# Patient Record
Sex: FEMALE | Race: BLACK OR AFRICAN AMERICAN | NOT HISPANIC OR LATINO | Employment: OTHER | URBAN - METROPOLITAN AREA
[De-identification: names, ages, dates, MRNs, and addresses within clinical notes are randomized per-mention and may not be internally consistent; named-entity substitution may affect disease eponyms.]

---

## 2017-05-31 ENCOUNTER — HOSPITAL ENCOUNTER (EMERGENCY)
Facility: HOSPITAL | Age: 37
Discharge: HOME | End: 2017-05-31
Payer: MEDICARE

## 2017-05-31 VITALS
OXYGEN SATURATION: 96 % | SYSTOLIC BLOOD PRESSURE: 155 MMHG | RESPIRATION RATE: 18 BRPM | HEART RATE: 92 BPM | HEIGHT: 63 IN | BODY MASS INDEX: 30.12 KG/M2 | TEMPERATURE: 99 F | WEIGHT: 170 LBS | DIASTOLIC BLOOD PRESSURE: 91 MMHG

## 2017-05-31 DIAGNOSIS — K02.9 DENTAL CARIES: ICD-10-CM

## 2017-05-31 DIAGNOSIS — K08.89 PAIN, DENTAL: Primary | ICD-10-CM

## 2017-05-31 PROCEDURE — 99283 EMERGENCY DEPT VISIT LOW MDM: CPT

## 2017-05-31 RX ORDER — NAPROXEN 500 MG/1
500 TABLET ORAL 2 TIMES DAILY WITH MEALS
Qty: 12 TABLET | Refills: 0 | Status: SHIPPED | OUTPATIENT
Start: 2017-05-31 | End: 2018-11-28

## 2017-05-31 RX ORDER — AMOXICILLIN AND CLAVULANATE POTASSIUM 875; 125 MG/1; MG/1
1 TABLET, FILM COATED ORAL 2 TIMES DAILY
Qty: 20 TABLET | Refills: 0 | Status: SHIPPED | OUTPATIENT
Start: 2017-05-31 | End: 2017-06-10

## 2017-05-31 NOTE — ED PROVIDER NOTES
"   History      Chief Complaint   Patient presents with    Dental Problem     " abscess to the right upper jaw"       Review of patient's allergies indicates:  No Known Allergies     HPI   HPI    5/31/2017, 2:34 PM   History obtained from the patient      History of Present Illness: Lee Ayala is a 36 y.o. female patient who presents to the Emergency Department for right upper dental pain for 2-3 days. Denies f/n/v.  Symptoms are constant and moderate in severity.  No mitigating or exacerbating factors reported.   No further complaints or concerns at this time.           PCP: Primary Doctor No       Past Medical History:  No past medical history on file.      Past Surgical History:  No past surgical history on file.        Family History:  No family history on file.        Social History:  Social History     Social History Main Topics    Smoking status: Never Smoker    Smokeless tobacco: Not on file    Alcohol use No    Drug use: Unknown    Sexual activity: Not on file       ROS     Review of Systems   Constitutional: Negative for chills and fever.   HENT: Positive for dental problem. Negative for facial swelling and sore throat.    Respiratory: Negative for shortness of breath.    Cardiovascular: Negative for chest pain.   Gastrointestinal: Negative for nausea and vomiting.   Genitourinary: Negative for dysuria.   Musculoskeletal: Negative for back pain.   Skin: Negative for rash.   Neurological: Negative for weakness.   Hematological: Does not bruise/bleed easily.   All other systems reviewed and are negative.      Physical Exam      Initial Vitals [05/31/17 1409]   BP Pulse Resp Temp SpO2   (!) 155/91 92 18 98.9 °F (37.2 °C) 96 %     Physical Exam  Vital signs and nursing notes reviewed.  Constitutional: Patient is in NAD. Awake and alert. Well-developed and well-nourished.  Head: Atraumatic. Normocephalic.  Eyes: PERRL. EOM intact. Conjunctivae nl. No scleral icterus.  ENT: Mucous " "membranes are moist. Oropharynx is clear.  Right upper gingival ttp, mild erythema and edema.  No fluctuance.  +dental caries.  No facial edema  Neck: Supple. No JVD. No lymphadenopathy.  No meningismus  Cardiovascular: Regular rate and rhythm. No murmurs, rubs, or gallops. Distal pulses are 2+ and symmetric.  Pulmonary/Chest: No respiratory distress. Clear to auscultation bilaterally. No wheezing, rales, or rhonchi.  Abdominal: Soft. Non-distended. No TTP. No rebound, guarding, or rigidity. Good bowel sounds.  Genitourinary: No CVA tenderness  Musculoskeletal: Moves all extremities. No edema.   Skin: Warm and dry.  Neurological: Awake and alert. No acute focal neurological deficits are appreciated.  Psychiatric: Normal affect. Good eye contact. Appropriate in content.      ED Course      Procedures  ED Vital Signs:  Vitals:    05/31/17 1409   BP: (!) 155/91   Pulse: 92   Resp: 18   Temp: 98.9 °F (37.2 °C)   TempSrc: Oral   SpO2: 96%   Weight: 77.1 kg (170 lb)   Height: 5' 3" (1.6 m)                 Imaging Results:  Imaging Results    None            The Emergency Provider reviewed the vital signs and test results, which are outlined above.    ED Discussion         All findings were reviewed with the patient/family in detail.  Findings seem to be most consistent with a diagnosis of dental pain and dental caries.  GAVE PT LIST OF DENTAL RESOURCES.  DISCUSSED POTENTIAL COMPLICATIONS IF PT DOES NOT FOLLOW UP WITH DENTIST.  All remaining questions and concerns were addressed at that time.  Patient/family has been counseled regarding the need for follow-up as well as the indication to return to the emergency room should new or worrisome developments occur.        Medication(s) given in the ER:  Medications - No data to display                New Prescriptions    AMOXICILLIN-CLAVULANATE 875-125MG (AUGMENTIN) 875-125 MG PER TABLET    Take 1 tablet by mouth 2 (two) times daily.    NAPROXEN (NAPROSYN) 500 MG TABLET    Take " 1 tablet (500 mg total) by mouth 2 (two) times daily with meals.          Medical Decision Making              MDM               Clinical Impression:        ICD-10-CM ICD-9-CM   1. Pain, dentalAcute K08.89 525.9   2. Dental cariesAcute K02.9 521.00              Disposition  Stable  Discharged     Rosemary Davis PA-C  05/31/17 [id number]

## 2018-11-27 NOTE — PROGRESS NOTES
"Lee Ayala  11/28/2018  [id number]    Primary Doctor No  Patient Care Team:  Primary Doctor No as PCP - General  Has the patient seen any provider outside of the [facility] network since the last visit? (yes). If yes, HIPPA forms completed and records requested.        Visit Type:New patient, annual    Chief Complaint:  Chief Complaint   Patient presents with    Establish Care    Foot Swelling     a little above her ankle then down to her feet. both sides. she said this has been going on for a long time    Edema     righ hand    Breast Problem     right side harder than left and sometimes it leaks       History of Present Illness:  New patient, annual exam    She reports pap done at [facility].  She has not had MMG.  She reports sensation that her right breast is harder than her left breast. She reports at times she has leakage from nipple.   Last period one week ago.      She has reports of history of foot swelling and this is chronic problem.She reports that going on for years. She reports that she has been seen by "leg doctor" and hasn't found anything. Never had blood clot.  She reports she has had US done of her lower ext and it was negative.  She also reports isolated swelling of her right hand.  Denies and CP or SOB.  Denies any HTN issues.     She reports she was on fluid pill in past and did not help.     She is on disability, reports collects SS, reports disability is slow cognition    History:  History reviewed. No pertinent past medical history.  History reviewed. No pertinent surgical history.  History reviewed. No pertinent family history.  Social History     Socioeconomic History    Marital status: Single     Spouse name: Not on file    Number of children: Not on file    Years of education: Not on file    Highest education level: Not on file   Social Needs    Financial resource strain: Not on file    Food insecurity - worry: Not on file    Food insecurity - " inability: Not on file    Transportation needs - medical: Not on file    Transportation needs - non-medical: Not on file   Occupational History    Not on file   Tobacco Use    Smoking status: Never Smoker    Smokeless tobacco: Never Used   Substance and Sexual Activity    Alcohol use: No    Drug use: No    Sexual activity: No   Other Topics Concern    Not on file   Social History Narrative    Not on file     There is no problem list on file for this patient.    Review of patient's allergies indicates:  No Known Allergies    The following were reviewed at this visit: active problem list, medication list, allergies, family history, social history, and health maintenance.    Medications:  Current Outpatient Medications on File Prior to Visit   Medication Sig Dispense Refill    [DISCONTINUED] naproxen (NAPROSYN) 500 MG tablet Take 1 tablet (500 mg total) by mouth 2 (two) times daily with meals. 12 tablet 0     No current facility-administered medications on file prior to visit.        Medications have been reviewed and reconciled with patient at this visit.  Barriers to medications present (no)    Adverse reactions to current medications (no)    Over the counter medications reviewed (Yes ), and if needed added to active Medication list at this visit.     Exam:  Wt Readings from Last 3 Encounters:   11/28/18 83.2 kg (183 lb 6.8 oz)   05/31/17 77.1 kg (170 lb)   05/04/15 79.8 kg (176 lb)     Temp Readings from Last 3 Encounters:   11/28/18 96.1 °F (35.6 °C) (Tympanic)   05/31/17 98.9 °F (37.2 °C) (Oral)   05/04/15 98.3 °F (36.8 °C) (Oral)     BP Readings from Last 3 Encounters:   11/28/18 127/84   05/31/17 (!) 155/91   05/04/15 136/86     Pulse Readings from Last 3 Encounters:   11/28/18 81   05/31/17 92   05/04/15 104     Body mass index is 32.49 kg/m².      Review of Systems   Constitutional: Negative.  Negative for chills and fever.   HENT: Negative.  Negative for congestion, sinus pain and sore throat.     Eyes: Negative for blurred vision and double vision.   Respiratory: Negative for cough, sputum production, shortness of breath and wheezing.    Cardiovascular: Positive for leg swelling. Negative for chest pain and palpitations.   Gastrointestinal: Negative for abdominal pain, constipation, diarrhea, heartburn, nausea and vomiting.   Genitourinary: Negative.         Breast tenderness right, intermittent discharge   Musculoskeletal: Negative.    Skin: Negative.  Negative for rash.   Neurological: Negative.    Endo/Heme/Allergies: Negative.  Negative for polydipsia. Does not bruise/bleed easily.   Psychiatric/Behavioral: Negative for depression and substance abuse.     Physical Exam   Constitutional: She is oriented to person, place, and time. She appears well-developed and well-nourished. No distress.   HENT:   Head: Normocephalic and atraumatic.   Right Ear: External ear normal.   Left Ear: External ear normal.   Nose: Nose normal.   Mouth/Throat: Oropharynx is clear and moist. No oropharyngeal exudate.   Eyes: Conjunctivae and EOM are normal. Pupils are equal, round, and reactive to light. Right eye exhibits no discharge. Left eye exhibits no discharge.   Neck: Normal range of motion. Neck supple. No thyromegaly present.   Cardiovascular: Normal rate, regular rhythm, normal heart sounds and intact distal pulses.   No murmur heard.  Pulmonary/Chest: Effort normal and breath sounds normal. No respiratory distress. She has no wheezes. Right breast exhibits no inverted nipple, no mass, no nipple discharge, no skin change and no tenderness. Left breast exhibits no inverted nipple, no mass, no nipple discharge, no skin change and no tenderness.   No expression of nipple discharge on exam  Right breast slightly larger than left, but no mass felt.  No skin changes.      Abdominal: Soft. Bowel sounds are normal. She exhibits no distension and no mass. There is no tenderness.   Musculoskeletal: Normal range of motion. She  exhibits edema.   Pedal edema to lower ext. Non pitting  No skin change.  No varicose veins.   Lymphadenopathy:     She has no cervical adenopathy.   Neurological: She is alert and oriented to person, place, and time. No cranial nerve deficit.   Skin: Capillary refill takes less than 2 seconds. She is not diaphoretic.   Psychiatric: She has a normal mood and affect. Her behavior is normal. Judgment and thought content normal.   Nursing note and vitals reviewed.      Laboratory Reviewed ({N/A)  No results found for: WBC, HGB, HCT, PLT, CHOL, TRIG, HDL, LDLDIRECT, ALT, AST, NA, K, CL, CREATININE, BUN, CO2, TSH, PSA, INR, GLUF, HGBA1C, MICROALBUR    Lee was seen today for establish care, foot swelling, edema and breast problem.    Diagnoses and all orders for this visit:    Annual physical exam  -     Comprehensive metabolic panel; Future  -     Lipid panel; Future  -     CBC auto differential; Future    Breast pain, right  -     Mammo Digital Diagnostic Bilateral With CAD; Future  -     US Breast Right Complete; Future    Nipple discharge in female  -     Mammo Digital Diagnostic Bilateral With CAD; Future  -     US Breast Right Complete; Future  -     Prolactin; Future    Edema, unspecified type   Compression stockings   Counseled on dependent edema.    Encounter for lipid screening for cardiovascular disease  -     Lipid panel; Future                  Care Plan/Goals: Reviewed  (Yes)  Goals     None          Follow up: No Follow-up on file.    After visit summary was printed and given to patient upon discharge today.  Patient goals and care plan are included in After Visit Summary.

## 2018-11-28 ENCOUNTER — LAB VISIT (OUTPATIENT)
Dept: LAB | Facility: HOSPITAL | Age: 38
End: 2018-11-28
Attending: FAMILY MEDICINE
Payer: MEDICARE

## 2018-11-28 ENCOUNTER — OFFICE VISIT (OUTPATIENT)
Dept: INTERNAL MEDICINE | Facility: CLINIC | Age: 38
End: 2018-11-28
Payer: MEDICARE

## 2018-11-28 VITALS
HEIGHT: 63 IN | SYSTOLIC BLOOD PRESSURE: 127 MMHG | DIASTOLIC BLOOD PRESSURE: 84 MMHG | OXYGEN SATURATION: 99 % | BODY MASS INDEX: 32.5 KG/M2 | TEMPERATURE: 96 F | HEART RATE: 81 BPM | WEIGHT: 183.44 LBS

## 2018-11-28 DIAGNOSIS — Z13.6 ENCOUNTER FOR LIPID SCREENING FOR CARDIOVASCULAR DISEASE: ICD-10-CM

## 2018-11-28 DIAGNOSIS — Z13.220 ENCOUNTER FOR LIPID SCREENING FOR CARDIOVASCULAR DISEASE: ICD-10-CM

## 2018-11-28 DIAGNOSIS — N64.52 NIPPLE DISCHARGE IN FEMALE: ICD-10-CM

## 2018-11-28 DIAGNOSIS — Z00.00 ANNUAL PHYSICAL EXAM: Primary | ICD-10-CM

## 2018-11-28 DIAGNOSIS — N64.4 BREAST PAIN, RIGHT: ICD-10-CM

## 2018-11-28 DIAGNOSIS — Z00.00 ANNUAL PHYSICAL EXAM: ICD-10-CM

## 2018-11-28 DIAGNOSIS — R60.9 EDEMA, UNSPECIFIED TYPE: ICD-10-CM

## 2018-11-28 LAB
ALBUMIN SERPL BCP-MCNC: 3.5 G/DL
ALP SERPL-CCNC: 50 U/L
ALT SERPL W/O P-5'-P-CCNC: 12 U/L
ANION GAP SERPL CALC-SCNC: 8 MMOL/L
AST SERPL-CCNC: 15 U/L
BASOPHILS # BLD AUTO: 0.01 K/UL
BASOPHILS NFR BLD: 0.2 %
BILIRUB SERPL-MCNC: 0.8 MG/DL
BUN SERPL-MCNC: 7 MG/DL
CALCIUM SERPL-MCNC: 9.1 MG/DL
CHLORIDE SERPL-SCNC: 103 MMOL/L
CHOLEST SERPL-MCNC: 166 MG/DL
CHOLEST/HDLC SERPL: 3.8 {RATIO}
CO2 SERPL-SCNC: 27 MMOL/L
CREAT SERPL-MCNC: 0.7 MG/DL
DIFFERENTIAL METHOD: ABNORMAL
EOSINOPHIL # BLD AUTO: 0.1 K/UL
EOSINOPHIL NFR BLD: 1.2 %
ERYTHROCYTE [DISTWIDTH] IN BLOOD BY AUTOMATED COUNT: 11.9 %
EST. GFR  (AFRICAN AMERICAN): >60 ML/MIN/1.73 M^2
EST. GFR  (NON AFRICAN AMERICAN): >60 ML/MIN/1.73 M^2
GLUCOSE SERPL-MCNC: 95 MG/DL
HCT VFR BLD AUTO: 33 %
HDLC SERPL-MCNC: 44 MG/DL
HDLC SERPL: 26.5 %
HGB BLD-MCNC: 10.9 G/DL
IMM GRANULOCYTES # BLD AUTO: 0.01 K/UL
IMM GRANULOCYTES NFR BLD AUTO: 0.2 %
LDLC SERPL CALC-MCNC: 91.4 MG/DL
LYMPHOCYTES # BLD AUTO: 1.6 K/UL
LYMPHOCYTES NFR BLD: 36.3 %
MCH RBC QN AUTO: 32.1 PG
MCHC RBC AUTO-ENTMCNC: 33 G/DL
MCV RBC AUTO: 97 FL
MONOCYTES # BLD AUTO: 0.3 K/UL
MONOCYTES NFR BLD: 7.2 %
NEUTROPHILS # BLD AUTO: 2.4 K/UL
NEUTROPHILS NFR BLD: 54.9 %
NONHDLC SERPL-MCNC: 122 MG/DL
NRBC BLD-RTO: 0 /100 WBC
PLATELET # BLD AUTO: 367 K/UL
PMV BLD AUTO: 9.7 FL
POTASSIUM SERPL-SCNC: 3.5 MMOL/L
PROLACTIN SERPL IA-MCNC: 12.5 NG/ML
PROT SERPL-MCNC: 7.3 G/DL
RBC # BLD AUTO: 3.4 M/UL
SODIUM SERPL-SCNC: 138 MMOL/L
TRIGL SERPL-MCNC: 153 MG/DL
WBC # BLD AUTO: 4.32 K/UL

## 2018-11-28 PROCEDURE — 99213 OFFICE O/P EST LOW 20 MIN: CPT | Mod: PBBFAC,25 | Performed by: FAMILY MEDICINE

## 2018-11-28 PROCEDURE — 84146 ASSAY OF PROLACTIN: CPT

## 2018-11-28 PROCEDURE — 36415 COLL VENOUS BLD VENIPUNCTURE: CPT

## 2018-11-28 PROCEDURE — 80061 LIPID PANEL: CPT

## 2018-11-28 PROCEDURE — 80053 COMPREHEN METABOLIC PANEL: CPT

## 2018-11-28 PROCEDURE — 90686 IIV4 VACC NO PRSV 0.5 ML IM: CPT | Mod: PBBFAC

## 2018-11-28 PROCEDURE — 99999 PR PBB SHADOW E&M-EST. PATIENT-LVL III: CPT | Mod: PBBFAC,,, | Performed by: FAMILY MEDICINE

## 2018-11-28 PROCEDURE — 85025 COMPLETE CBC W/AUTO DIFF WBC: CPT

## 2018-11-28 PROCEDURE — 99204 OFFICE O/P NEW MOD 45 MIN: CPT | Mod: S$PBB,,, | Performed by: FAMILY MEDICINE

## 2018-11-30 NOTE — PROGRESS NOTES
Notify patient of labs  Prolactin 12.5, normal  CMP is fine. Normal glucose, kidney and liver.   CHolesterol okay, Trig 153, goal 150. Work on low fat diet.  CBC shows an anemia, mild. I suggest MVI OTC with Iron.    Overall, labs okay.

## 2019-07-01 ENCOUNTER — TELEPHONE (OUTPATIENT)
Dept: INTERNAL MEDICINE | Facility: CLINIC | Age: 39
End: 2019-07-01

## 2019-07-01 NOTE — TELEPHONE ENCOUNTER
----- Message from Ursula Claire sent at 7/1/2019  9:19 AM CDT -----  Contact: Pt  Type:  RX Refill Request    Who Called: Lee (pt)  Refill or New Rx: Refill  RX Name and Strength: HYDROcodone-acetaminophen (NORCO) 5-325 mg per tablet  How is the patient currently taking it? (ex. 1XDay): 4XDay  Is this a 30 day or 90 day RX: 30 Day   Preferred Pharmacy with phone number:   [facility] [address] [facility]   Local or Mail Order: Local  [facility]  Would the patient rather a call back or a response via [facility]? Callback  Best Call Back Number: [phone]  Additional Information: No

## 2019-07-01 NOTE — TELEPHONE ENCOUNTER
----- Message from Ghada Gould sent at 7/1/2019  3:03 PM CDT -----  Would like to consult with the nurse, patient states that she has been trying to get a refill and was told that it was going to be sent in, patient states that's the pharmacy,  does not have it, patient would like to speak with the nurse concerning this as soon as possible, please call back at [phone], thanks sj

## 2019-07-01 NOTE — TELEPHONE ENCOUNTER
Called and spoke with patient. Advised her that she would need to be seen for a refill on that medication. Appointment on Monday.

## 2019-07-01 NOTE — TELEPHONE ENCOUNTER
No, I will not send in Refill on narcotics. Needs to be seen, as I need to determine if over 7 days of narcotics is needed.

## 2019-07-01 NOTE — TELEPHONE ENCOUNTER
Called and spoke with patient. Advised her that Narcotics can not be filled without and appointment and eval. Patient verbalized understandting. Appointment Monday and she is on the waiting list for cancellations.

## 2019-07-01 NOTE — TELEPHONE ENCOUNTER
Okay. Thanks.  NSAID For pain is fine.  Usually I would like to transition them off narcotics after a week any how, but I need to know what is going on with her and if she saw Ortho.

## 2019-07-01 NOTE — TELEPHONE ENCOUNTER
Patient coming in Monday. She called again because whoever took the message told her that her refill would be sent to her pharmacy. Please Advise

## 2019-07-08 ENCOUNTER — HOSPITAL ENCOUNTER (OUTPATIENT)
Dept: RADIOLOGY | Facility: HOSPITAL | Age: 39
Discharge: HOME | End: 2019-07-08
Attending: FAMILY MEDICINE
Payer: MEDICARE

## 2019-07-08 ENCOUNTER — OFFICE VISIT (OUTPATIENT)
Dept: INTERNAL MEDICINE | Facility: CLINIC | Age: 39
End: 2019-07-08
Payer: MEDICARE

## 2019-07-08 VITALS
TEMPERATURE: 97 F | BODY MASS INDEX: 32.05 KG/M2 | HEIGHT: 62 IN | OXYGEN SATURATION: 98 % | SYSTOLIC BLOOD PRESSURE: 130 MMHG | HEART RATE: 90 BPM | DIASTOLIC BLOOD PRESSURE: 78 MMHG | WEIGHT: 174.19 LBS

## 2019-07-08 DIAGNOSIS — M25.572 ACUTE LEFT ANKLE PAIN: ICD-10-CM

## 2019-07-08 DIAGNOSIS — M25.572 ACUTE LEFT ANKLE PAIN: Primary | ICD-10-CM

## 2019-07-08 PROCEDURE — 99999 PR PBB SHADOW E&M-EST. PATIENT-LVL III: CPT | Mod: PBBFAC,,, | Performed by: FAMILY MEDICINE

## 2019-07-08 PROCEDURE — 99214 PR OFFICE/OUTPT VISIT, EST, LEVL IV, 30-39 MIN: ICD-10-PCS | Mod: S$GLB,,, | Performed by: FAMILY MEDICINE

## 2019-07-08 PROCEDURE — 99999 PR PBB SHADOW E&M-EST. PATIENT-LVL III: ICD-10-PCS | Mod: PBBFAC,,, | Performed by: FAMILY MEDICINE

## 2019-07-08 PROCEDURE — 3008F BODY MASS INDEX DOCD: CPT | Mod: CPTII,S$GLB,, | Performed by: FAMILY MEDICINE

## 2019-07-08 PROCEDURE — 3008F PR BODY MASS INDEX (BMI) DOCUMENTED: ICD-10-PCS | Mod: CPTII,S$GLB,, | Performed by: FAMILY MEDICINE

## 2019-07-08 PROCEDURE — 73610 X-RAY EXAM OF ANKLE: CPT | Mod: TC,LT

## 2019-07-08 PROCEDURE — 99214 OFFICE O/P EST MOD 30 MIN: CPT | Mod: S$GLB,,, | Performed by: FAMILY MEDICINE

## 2019-07-08 PROCEDURE — 73610 XR ANKLE COMPLETE 3 VIEW LEFT: ICD-10-PCS | Mod: 26,LT,, | Performed by: RADIOLOGY

## 2019-07-08 PROCEDURE — 73610 X-RAY EXAM OF ANKLE: CPT | Mod: 26,LT,, | Performed by: RADIOLOGY

## 2019-07-08 RX ORDER — NAPROXEN 500 MG/1
500 TABLET ORAL 2 TIMES DAILY
Qty: 20 TABLET | Refills: 0 | Status: SHIPPED | OUTPATIENT
Start: 2019-07-08 | End: 2019-10-17 | Stop reason: SDUPTHER

## 2019-07-08 NOTE — PROGRESS NOTES
Lee Ayala  07/08/2019  [id number]    Shameka Ford MD  Patient Care Team:  Shameka Ford MD as PCP - General (Family Medicine)  Has the patient seen any provider outside of the [facility] network since the last visit? (yes). If yes, HIPPA forms completed and records requested.        Visit Type:a scheduled routine follow-up visit    Chief Complaint:  Chief Complaint   Patient presents with    left ankle pain       History of Present Illness:  Patient here for follow up ER, on her ankle.  Hurt Ankle on 6/26/2019, trip and fall.  3 views of the left ankle.    Soft tissue swelling is present in the ankle.    A thin linear lucency in the posterior talus on the lateral view likely represents a superimposed structure however correlation for focal tenderness may be beneficial. 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    She was given Norco, and told to follow up with PCP and [facility].  She does not have appt with Ortho.    Missed appt for follow up, and was put on wait list. Requested more Norco., declined.   She was placed in splint. Non weight bearing per urgent care recommendations.    History:  No past medical history on file.  No past surgical history on file.  No family history on file.  Social History     Socioeconomic History    Marital status: Single     Spouse name: Not on file    Number of children: Not on file    Years of education: Not on file    Highest education level: Not on file   Occupational History    Not on file   Social Needs    Financial resource strain: Not on file    Food insecurity:     Worry: Not on file     Inability: Not on file    Transportation needs:     Medical: Not on file     Non-medical: Not on file   Tobacco Use    Smoking status: Never Smoker    Smokeless tobacco: Never Used   Substance and Sexual Activity    Alcohol use: No    Drug use: No    Sexual activity: Never   Lifestyle    Physical activity:      Days per week: Not on file     Minutes per session: Not on file    Stress: Not on file   Relationships    Social connections:     Talks on phone: Not on file     Gets together: Not on file     Attends [employer] service: Not on file     Active member of club or organization: Not on file     Attends meetings of clubs or organizations: Not on file     Relationship status: Not on file   Other Topics Concern    Not on file   Social History Narrative    Not on file     There is no problem list on file for this patient.    Review of patient's allergies indicates:  No Known Allergies    The following were reviewed at this visit: active problem list, medication list, allergies, family history, social history, and health maintenance.    Medications:  Current Outpatient Medications on File Prior to Visit   Medication Sig Dispense Refill    HYDROcodone-acetaminophen (NORCO) 5-325 mg per tablet Take 1 tablet by mouth every 4 (four) hours as needed.       No current facility-administered medications on file prior to visit.        Medications have been reviewed and reconciled with patient at this visit.  Barriers to medications present (yes)    Adverse reactions to current medications (no)    Over the counter medications reviewed (Yes ), and if needed added to active Medication list at this visit.     Exam:  Wt Readings from Last 3 Encounters:   07/08/19 79 kg (174 lb 2.6 oz)   11/28/18 83.2 kg (183 lb 6.8 oz)   05/31/17 77.1 kg (170 lb)     Temp Readings from Last 3 Encounters:   07/08/19 97.3 °F (36.3 °C) (Tympanic)   11/28/18 96.1 °F (35.6 °C) (Tympanic)   05/31/17 98.9 °F (37.2 °C) (Oral)     BP Readings from Last 3 Encounters:   07/08/19 130/78   11/28/18 127/84   05/31/17 (!) 155/91     Pulse Readings from Last 3 Encounters:   07/08/19 90   11/28/18 81   05/31/17 92     Body mass index is 31.85 kg/m².      Review of Systems   Constitutional: Negative.  Negative for chills and fever.   HENT: Negative.  Negative for  congestion, sinus pain and sore throat.    Eyes: Negative for blurred vision and double vision.   Respiratory: Negative for cough, sputum production, shortness of breath and wheezing.    Cardiovascular: Negative for chest pain, palpitations and leg swelling.   Gastrointestinal: Negative for abdominal pain, constipation, diarrhea, heartburn, nausea and vomiting.   Genitourinary: Negative.    Musculoskeletal: Positive for joint pain.   Skin: Negative.  Negative for rash.   Neurological: Negative.    Endo/Heme/Allergies: Negative.  Negative for polydipsia. Does not bruise/bleed easily.   Psychiatric/Behavioral: Negative for depression and substance abuse.     Physical Exam   Constitutional: She is oriented to person, place, and time. She appears well-developed and well-nourished. No distress.   HENT:   Head: Normocephalic and atraumatic.   Right Ear: External ear normal.   Left Ear: External ear normal.   Nose: Nose normal.   Mouth/Throat: Oropharynx is clear and moist. No oropharyngeal exudate.   Eyes: Pupils are equal, round, and reactive to light. Conjunctivae and EOM are normal. Right eye exhibits no discharge. Left eye exhibits no discharge.   Neck: Normal range of motion. Neck supple. No thyromegaly present.   Cardiovascular: Normal rate, regular rhythm, normal heart sounds and intact distal pulses.   No murmur heard.  Pulmonary/Chest: Effort normal and breath sounds normal. No respiratory distress. She has no wheezes.   Abdominal: Soft. Bowel sounds are normal. She exhibits no distension and no mass. There is no tenderness.   Musculoskeletal: Normal range of motion. She exhibits tenderness. She exhibits no edema.        Left ankle: Tenderness.   Lymphadenopathy:     She has no cervical adenopathy.   Neurological: She is alert and oriented to person, place, and time. No cranial nerve deficit.   Skin: Capillary refill takes less than 2 seconds. She is not diaphoretic.   Psychiatric: She has a normal mood and  affect. Her behavior is normal. Judgment and thought content normal.   Nursing note and vitals reviewed.      Laboratory Reviewed ({Yes)  Lab Results   Component Value Date    WBC 4.32 11/28/2018    HGB 10.9 (L) 11/28/2018    HCT 33.0 (L) 11/28/2018     (H) 11/28/2018    CHOL 166 11/28/2018    TRIG 153 (H) 11/28/2018    HDL 44 11/28/2018    ALT 12 11/28/2018    AST 15 11/28/2018     11/28/2018    K 3.5 11/28/2018     11/28/2018    CREATININE 0.7 11/28/2018    BUN 7 11/28/2018    CO2 27 11/28/2018       Lee was seen today for left ankle pain.    Diagnoses and all orders for this visit:    Acute left ankle pain  -     X-Ray Ankle Complete 3 View Left; Future  -     Ambulatory Referral to Orthopedics    Recheck Xray- unsure if fracture on last xray  Changed wrap on splint.  Remains in splint until ortho clears. Appt made for Wed of this week for follow up.  Transition to NSAID, off [place]                Care Plan/Goals: Reviewed  (Yes)  Goals     None          Follow up: No follow-ups on file.    After visit summary was printed and given to patient upon discharge today.  Patient goals and care plan are included in After Visit Summary.

## 2019-07-08 NOTE — PROGRESS NOTES
Robbieissa vernon looked okay.  I will let ortho review  Pain is improved.  Will see what ortho says Wed, keep appt.  Splint intact until ortho clears

## 2019-07-12 ENCOUNTER — OFFICE VISIT (OUTPATIENT)
Dept: ORTHOPEDICS | Facility: CLINIC | Age: 39
End: 2019-07-12
Payer: MEDICARE

## 2019-07-12 VITALS
HEIGHT: 62 IN | HEART RATE: 70 BPM | BODY MASS INDEX: 30.83 KG/M2 | WEIGHT: 167.56 LBS | DIASTOLIC BLOOD PRESSURE: 89 MMHG | SYSTOLIC BLOOD PRESSURE: 143 MMHG

## 2019-07-12 DIAGNOSIS — S93.402A SPRAIN OF LEFT ANKLE, UNSPECIFIED LIGAMENT, INITIAL ENCOUNTER: Primary | ICD-10-CM

## 2019-07-12 PROCEDURE — 3008F BODY MASS INDEX DOCD: CPT | Mod: CPTII,S$GLB,, | Performed by: PHYSICIAN ASSISTANT

## 2019-07-12 PROCEDURE — 99202 PR OFFICE/OUTPT VISIT, NEW, LEVL II, 15-29 MIN: ICD-10-PCS | Mod: S$GLB,,, | Performed by: PHYSICIAN ASSISTANT

## 2019-07-12 PROCEDURE — 99999 PR PBB SHADOW E&M-EST. PATIENT-LVL III: ICD-10-PCS | Mod: PBBFAC,,, | Performed by: PHYSICIAN ASSISTANT

## 2019-07-12 PROCEDURE — 3008F PR BODY MASS INDEX (BMI) DOCUMENTED: ICD-10-PCS | Mod: CPTII,S$GLB,, | Performed by: PHYSICIAN ASSISTANT

## 2019-07-12 PROCEDURE — 99999 PR PBB SHADOW E&M-EST. PATIENT-LVL III: CPT | Mod: PBBFAC,,, | Performed by: PHYSICIAN ASSISTANT

## 2019-07-12 PROCEDURE — 99202 OFFICE O/P NEW SF 15 MIN: CPT | Mod: S$GLB,,, | Performed by: PHYSICIAN ASSISTANT

## 2019-07-12 NOTE — PATIENT INSTRUCTIONS
Ankle Dorsiflexion (Strength)                                   This exercise is for your right ankle. Switch sides for your left ankle.  1. Tie an elastic exercise band or tubing to the bottom part of a table. Tie the other end to your right foot.  2. Sit on the floor with your right leg straight. Sit far enough away from the table so that the elastic band or tubing is pulled tight between your foot and the table leg.  3. Slowly flex your right foot and pull your toes back toward you. Keep your right leg straight. Hold for 5 seconds.  4. Relax your foot.  5. Repeat this exercise 10 times.  Date Last Reviewed: 5/1/2016  © 5668-9192 [employer]. [address] [place] [address]. All rights reserved. This information is not intended as a substitute for professional medical care. Always follow your healthcare professional's instructions.        Ankle Inversion (Strength)     This exercise is for your right ankle. Switch sides for your left ankle.   6. Tie an elastic exercise band or tubing to the leg of a table. Tie the other end to your right foot.  7. Stand far enough away from the table so that the elastic band or tubing is pulled tight.  8. Point your right foot firmly to the left, pulling on the elastic band or tubing. Hold for 5 seconds.  9. Relax your foot back to a straight position. Repeat this exercise 10 times.  10. Do this exercise 3 times a day, or as instructed.  Date Last Reviewed: 5/1/2016  © 6428-0487 [employer]. [address] [place] [address]. All rights reserved. This information is not intended as a substitute for professional medical care. Always follow your healthcare professional's instructions.

## 2019-07-12 NOTE — LETTER
July 15, 2019      Shameka Ford MD  [address] [facility] [address]           [facility]  [address] [facility] [address]  Phone: [phone]  Fax: [phone]          Patient: Lee Ayala   MR Number: [id number]   YOB: 1980   Date of Visit: 7/12/2019       Dear Dr. Shameka Ford:    Thank you for referring Lee Ayala to me for evaluation. Attached you will find relevant portions of my assessment and plan of care.    If you have questions, please do not hesitate to call me. I look forward to following Lee Ayala along with you.    Sincerely,    GARETH Levyosure  CC:  No Recipients    If you would like to receive this communication electronically, please contact [email or web address] or [phone] to request more information on [employer] [employer] access.    For providers and/or their staff who would like to refer a patient to [facility], please contact us through our one-stop-shop provider referral line, [facility], at [phone].    If you feel you have received this communication in error or would no longer like to receive these types of communications, please e-mail [email or web address]

## 2019-07-12 NOTE — PROGRESS NOTES
Subjective:      Patient ID: Lee Ayala is a 38 y.o. female.    Chief Complaint: Pain of the Left Ankle      HPI: Lee Ayala  is a 38 y.o. female who c/o Pain of the Left Ankle   for duration of 3 weeks.  She injured the Left ankle when she rolled it at home.  She was seen in the emergency department and placed into a posterior splint.  She later followed up with primary care who got x-rays and referred her to Orthopedics for definitive management.  Pain level today is 1/10 in severity.  She has been walking on the splint.  She states the pain is much improved.  Quality is intermittent aching pain.  Worsened with prolonged weight-bearing.  Improved with rest.  She states the associated swelling has also essentially resolved.    History reviewed. No pertinent past medical history.  History reviewed. No pertinent surgical history.  History reviewed. No pertinent family history.  Social History     Socioeconomic History    Marital status: Single     Spouse name: Not on file    Number of children: Not on file    Years of education: Not on file    Highest education level: Not on file   Occupational History    Not on file   Social Needs    Financial resource strain: Not on file    Food insecurity:     Worry: Not on file     Inability: Not on file    Transportation needs:     Medical: Not on file     Non-medical: Not on file   Tobacco Use    Smoking status: Never Smoker    Smokeless tobacco: Never Used   Substance and Sexual Activity    Alcohol use: No    Drug use: No    Sexual activity: Never   Lifestyle    Physical activity:     Days per week: Not on file     Minutes per session: Not on file    Stress: Not on file   Relationships    Social connections:     Talks on phone: Not on file     Gets together: Not on file     Attends Hinduism service: Not on file     Active member of club or organization: Not on file     Attends meetings of clubs or organizations: Not on  file     Relationship status: Not on file   Other Topics Concern    Not on file   Social History Narrative    Not on file     Medication List with Changes/Refills   Current Medications    HYDROCODONE-ACETAMINOPHEN (NORCO) 5-325 MG PER TABLET    Take 1 tablet by mouth every 4 (four) hours as needed.    NAPROXEN (NAPROSYN) 500 MG TABLET    Take 1 tablet (500 mg total) by mouth 2 (two) times daily.     Review of patient's allergies indicates:  No Known Allergies    Review of Systems   Constitution: Negative for fever.   Cardiovascular: Negative for chest pain.   Respiratory: Negative for cough and shortness of breath.    Skin: Negative for rash.   Musculoskeletal: Positive for joint pain. Negative for joint swelling and stiffness.   Gastrointestinal: Negative for heartburn.   Neurological: Negative for headaches and numbness.         Objective:        General    Nursing note and vitals reviewed.  Constitutional: She is oriented to person, place, and time. She appears well-developed and well-nourished.   HENT:   Head: Normocephalic and atraumatic.   Eyes: EOM are normal.   Cardiovascular: Normal rate and regular rhythm.    Pulmonary/Chest: Effort normal.   Abdominal: Soft.   Neurological: She is alert and oriented to person, place, and time.   Psychiatric: She has a normal mood and affect. Her behavior is normal.         Right Ankle/Foot Exam     Other   Sensation: normal    Left Ankle/Foot Exam     Inspection  Deformity: absent  Erythema: absent  Bruising: Ankle - absent Foot - absent  Effusion: Ankle - absent Foot - absent  Atrophy: Ankle - absent Foot - absent    Range of Motion   Ankle Joint  Dorsiflexion: normal   Plantar flexion: normal     Subtalar Joint   Inversion: normal   Eversion: normal   Hayes Test:  normal  First MTP Joint: normal    Alignment   Hindfoot Alignment: neutral  Midfoot Alignment: normal  Forefoot Alignment: normal    Tests   Anterior drawer: negative  Heel Walk: able to perform  Tiptoe  Walk: able to perform  Single Heel Rise: able to perform    Other   Sensation: normal    Comments:  Cap refill < 2 sec  Comp Soft  Calf NT  Min TTP PTFL, o/w no TTP  Left Hand/Wrist Exam     Comments:  .          Muscle Strength   Left Lower Extremity   Anterior tibial:  5/5/5   Posterior tibial:  5/5/5  Gastrocsoleus:  5/5/5  Peroneal muscle:  5/5/5    Vascular Exam       Left Pulses  Dorsalis Pedis:      2+          Edema  Right Lower Leg: absent  Left Lower Leg: absent              Xray images and report were reviewed today.  I agree with the radiologist's interpretation.    X-Ray Ankle Complete 3 View Left  Narrative: EXAMINATION:  XR ANKLE COMPLETE 3 VIEW LEFT    CLINICAL HISTORY:  Pain in left ankle and joints of left foot    TECHNIQUE:  AP, lateral and oblique views of the left ankle were performed.    COMPARISON:  None    FINDINGS:  Detail obscured by overlying splint material.  Talar dome is intact.  No acute displaced fracture or dislocation is seen within the limitations of the exam.  Impression: As above    Electronically signed by: Earl Smallwood MD  Date:    07/08/2019  Time:    14:56        Assessment:       Encounter Diagnosis   Name Primary?    Sprain of left ankle, unspecified ligament, initial encounter Yes          Plan:       Lee was seen today for pain.    Diagnoses and all orders for this visit:    Sprain of left ankle, unspecified ligament, initial encounter        Lee Ayala is a new pt who comes in today for the above problems.  She is able to ambulate in the office without pain.  She is also able to do a single leg toe raise on the left side without pain.  At this point, I recommend a lace-up ankle brace.  She goes on to tell me that she thinks she has 1 at home.  I certainly do not think she needs to be immobilized in a boot at this time. She will use a lace-up ankle brace when she is active.  I have also gone over a home exercise program for her.  I would like to  see her back in the office in 1 month to re-evaluate her progress.  She verbalizes understanding and agrees.    Follow up in about 1 month (around 8/12/2019).          The patient understands, chooses and consents to this plan and accepts all   the risks which include but are not limited to the risks mentioned above.     Disclaimer: This note was prepared using a voice recognition system and is likely to have sound alike errors within the text.

## 2019-07-29 ENCOUNTER — NURSE TRIAGE (OUTPATIENT)
Dept: ADMINISTRATIVE | Facility: CLINIC | Age: 39
End: 2019-07-29

## 2019-07-29 NOTE — TELEPHONE ENCOUNTER
Reason for Disposition   Pain also present in shoulder(s) or arm(s) or jaw    Protocols used: CHEST PAIN-A-OH    Lee states she has been having chest pains for several days, now worsening, and now with pain to the left upper arm and left shoulder.  Chest pain rated 6 of 10.  [facility], recommend ED now for evaluation.  Message to Shameka Ford MD, pcp.  Please contact caller directly with any additional care advice.

## 2019-08-06 NOTE — TELEPHONE ENCOUNTER
Called and spoke with patient. She said that she is feeling good. She went to the General for her chest pain. She said that she has been having some ankle pains at night. She can't come see Dr. Ford right now because she doesn't have any transportation. She said once she get the kids settled in school then she will schedule an appointment.

## 2019-09-19 ENCOUNTER — TELEPHONE (OUTPATIENT)
Dept: RADIOLOGY | Facility: HOSPITAL | Age: 39
End: 2019-09-19

## 2019-10-07 ENCOUNTER — PATIENT OUTREACH (OUTPATIENT)
Dept: ADMINISTRATIVE | Facility: HOSPITAL | Age: 39
End: 2019-10-07

## 2019-10-07 NOTE — PROGRESS NOTES
Pap completed 8-3-15 by Kylie Majano (not [facility]) Attempted to contact pt Re updated info about pap smear no answer.. Unable to LVM.

## 2019-10-17 ENCOUNTER — OFFICE VISIT (OUTPATIENT)
Dept: INTERNAL MEDICINE | Facility: CLINIC | Age: 39
End: 2019-10-17
Payer: MEDICARE

## 2019-10-17 ENCOUNTER — CLINICAL SUPPORT (OUTPATIENT)
Dept: CARDIOLOGY | Facility: CLINIC | Age: 39
End: 2019-10-17
Payer: MEDICARE

## 2019-10-17 VITALS
TEMPERATURE: 96 F | WEIGHT: 174.63 LBS | HEIGHT: 62 IN | BODY MASS INDEX: 32.14 KG/M2 | OXYGEN SATURATION: 99 % | DIASTOLIC BLOOD PRESSURE: 78 MMHG | HEART RATE: 88 BPM | SYSTOLIC BLOOD PRESSURE: 118 MMHG

## 2019-10-17 DIAGNOSIS — R07.9 CHEST PAIN: ICD-10-CM

## 2019-10-17 DIAGNOSIS — R07.9 CHEST PAIN, UNSPECIFIED TYPE: ICD-10-CM

## 2019-10-17 DIAGNOSIS — M94.0 COSTOCHONDRITIS: Primary | ICD-10-CM

## 2019-10-17 PROCEDURE — 93010 ELECTROCARDIOGRAM REPORT: CPT | Mod: S$GLB,,, | Performed by: INTERNAL MEDICINE

## 2019-10-17 PROCEDURE — 99999 PR PBB SHADOW E&M-EST. PATIENT-LVL III: CPT | Mod: PBBFAC,,, | Performed by: FAMILY MEDICINE

## 2019-10-17 PROCEDURE — 93005 EKG 12-LEAD: ICD-10-PCS | Mod: S$GLB,,, | Performed by: FAMILY MEDICINE

## 2019-10-17 PROCEDURE — 99213 PR OFFICE/OUTPT VISIT, EST, LEVL III, 20-29 MIN: ICD-10-PCS | Mod: S$GLB,,, | Performed by: FAMILY MEDICINE

## 2019-10-17 PROCEDURE — 93010 EKG 12-LEAD: ICD-10-PCS | Mod: S$GLB,,, | Performed by: INTERNAL MEDICINE

## 2019-10-17 PROCEDURE — 99999 PR PBB SHADOW E&M-EST. PATIENT-LVL III: ICD-10-PCS | Mod: PBBFAC,,, | Performed by: FAMILY MEDICINE

## 2019-10-17 PROCEDURE — 3008F PR BODY MASS INDEX (BMI) DOCUMENTED: ICD-10-PCS | Mod: CPTII,S$GLB,, | Performed by: FAMILY MEDICINE

## 2019-10-17 PROCEDURE — 99213 OFFICE O/P EST LOW 20 MIN: CPT | Mod: S$GLB,,, | Performed by: FAMILY MEDICINE

## 2019-10-17 PROCEDURE — 93005 ELECTROCARDIOGRAM TRACING: CPT | Mod: S$GLB,,, | Performed by: FAMILY MEDICINE

## 2019-10-17 PROCEDURE — 3008F BODY MASS INDEX DOCD: CPT | Mod: CPTII,S$GLB,, | Performed by: FAMILY MEDICINE

## 2019-10-17 RX ORDER — NAPROXEN 500 MG/1
500 TABLET ORAL 2 TIMES DAILY
Qty: 40 TABLET | Refills: 0 | Status: SHIPPED | OUTPATIENT
Start: 2019-10-17 | End: 2023-02-09 | Stop reason: CLARIF

## 2019-10-17 NOTE — PATIENT INSTRUCTIONS
Costochondritis    Costochondritis is inflammation of a rib or the cartilage that connects a rib to your breastbone (sternum). It causes tenderness, and sometimes chest pain may be sharp or aching, or it may feel like pressure. Pain may get worse with deep breathing, movement, or exercise. In some cases, the pain is mistaken for a heart attack. Despite this, the condition is not serious. Read on to learn more about the condition and how it can be treated.  What causes costochondritis?  The cause of costochondritis is not completely clear, but it may happen after a chest injury, chest infection, or coughing episode. Some physical activities can sometimes lead to costochondritis. Large-breasted women may be more likely to have the condition. Often, the reason for the inflammation is unknown.  Diagnosing costochondritis  There is no test for costochondritis. The condition is diagnosed by the symptoms you have. Your healthcare provider will perform a physical exam. He or she will ask you about your symptoms and examine your chest for tenderness. In some cases, tests are done to rule out more serious problems. These tests may include imaging tests such as chest X-ray, CT scan, or an ECG.  Treating costochondritis  If an underlying cause is found, treatment for that will likely relieve the problem. Costochondritis often goes away on its own. The course of the condition varies from person to person. It usually lasts from weeks to months. In some cases, mild symptoms continue for months to years. To ease symptoms:  · Take medicine as directed. These relieve pain and swelling. Ibuprofen or other NSAIDs are often recommended. In some cases, you may be given prescription medicine, such as muscle relaxants.  · Avoid activities that put stress on the chest or spine.  · Apply a heating pad (set to warm, not too high, heat) to the breastbone several times a day.  · Perform stretching exercises as directed.  Call the healthcare  provider right away if you have any of the following:  · Pain that is not relieved by medicine  · Shortness of breath  · Lightheadedness, dizziness, or fainting  · Feeling of irregular heartbeat or fast pulse  Anyone with chest pain should see a healthcare provider, especially those who are older and may be at risk for heart disease.   Date Last Reviewed: 10/1/2016  © 4528-0409 [employer]. [address] [place] [address], [place] [address]. All rights reserved. This information is not intended as a substitute for professional medical care. Always follow your healthcare professional's instructions.

## 2019-10-17 NOTE — PROGRESS NOTES
Lee Ayala  10/17/2019  [id number]    Shameka Ford MD  Patient Care Team:  Shameka Ford MD as PCP - General (Family Medicine)  Has the patient seen any provider outside of the [facility] network since the last visit? (no). If yes, HIPPA forms completed and records requested.        Visit Type:a scheduled routine follow-up visit    Chief Complaint:  Chief Complaint   Patient presents with    Chest Pain     for the last two or three weeks now       History of Present Illness:  3 weeks of chest pain.  She reports that she has it everyday.   She reports that it can stop during day  She feels that nothing makes it worse or nothing makes it better.  She didn't take any medication.  She feels that taking a deep breath can make it hurt more.   She feels it in the center of her chest, and top of chest.  She denies food changing the pain.          History:  History reviewed. No pertinent past medical history.  History reviewed. No pertinent surgical history.  History reviewed. No pertinent family history.  Social History     Socioeconomic History    Marital status: Single     Spouse name: Not on file    Number of children: Not on file    Years of education: Not on file    Highest education level: Not on file   Occupational History    Not on file   Social Needs    Financial resource strain: Not on file    Food insecurity:     Worry: Not on file     Inability: Not on file    Transportation needs:     Medical: Not on file     Non-medical: Not on file   Tobacco Use    Smoking status: Never Smoker    Smokeless tobacco: Never Used   Substance and Sexual Activity    Alcohol use: No    Drug use: No    Sexual activity: Never   Lifestyle    Physical activity:     Days per week: Not on file     Minutes per session: Not on file    Stress: Not on file   Relationships    Social connections:     Talks on phone: Not on file     Gets together: Not on file     Attends Hoahaoism service: Not on file      Active member of club or organization: Not on file     Attends meetings of clubs or organizations: Not on file     Relationship status: Not on file   Other Topics Concern    Not on file   Social History Narrative    Not on file     There is no problem list on file for this patient.    Review of patient's allergies indicates:  No Known Allergies    The following were reviewed at this visit: active problem list, medication list, allergies, family history, social history, and health maintenance.    Medications:  Current Outpatient Medications on File Prior to Visit   Medication Sig Dispense Refill    [DISCONTINUED] HYDROcodone-acetaminophen (NORCO) 5-325 mg per tablet Take 1 tablet by mouth every 4 (four) hours as needed.      [DISCONTINUED] naproxen (NAPROSYN) 500 MG tablet Take 1 tablet (500 mg total) by mouth 2 (two) times daily. (Patient not taking: Reported on 10/17/2019) 20 tablet 0     No current facility-administered medications on file prior to visit.        Medications have been reviewed and reconciled with patient at this visit.  Barriers to medications present (no)    Adverse reactions to current medications (no)    Over the counter medications reviewed (No ), and if needed added to active Medication list at this visit.     Exam:  Wt Readings from Last 3 Encounters:   10/17/19 79.2 kg (174 lb 9.7 oz)   07/12/19 76 kg (167 lb 8.8 oz)   07/08/19 79 kg (174 lb 2.6 oz)     Temp Readings from Last 3 Encounters:   10/17/19 96.3 °F (35.7 °C) (Tympanic)   07/08/19 97.3 °F (36.3 °C) (Tympanic)   11/28/18 96.1 °F (35.6 °C) (Tympanic)     BP Readings from Last 3 Encounters:   10/17/19 118/78   07/12/19 (!) 143/89   07/08/19 130/78     Pulse Readings from Last 3 Encounters:   10/17/19 88   07/12/19 70   07/08/19 90     Body mass index is 31.94 kg/m².      Review of Systems   Constitutional: Negative.  Negative for chills and fever.   HENT: Negative.  Negative for congestion, sinus pain and sore throat.    Eyes:  Negative for blurred vision and double vision.   Respiratory: Negative for cough, sputum production, shortness of breath and wheezing.    Cardiovascular: Negative for chest pain, palpitations and leg swelling.   Gastrointestinal: Negative for abdominal pain, constipation, diarrhea, heartburn, nausea and vomiting.   Genitourinary: Negative.    Musculoskeletal: Negative.    Skin: Negative.  Negative for rash.   Endo/Heme/Allergies: Negative.  Negative for polydipsia. Does not bruise/bleed easily.   Psychiatric/Behavioral: Negative for depression and substance abuse.     Physical Exam   Constitutional: She is oriented to person, place, and time. She appears well-developed and well-nourished. No distress.   HENT:   Head: Normocephalic and atraumatic.   Right Ear: External ear normal.   Left Ear: External ear normal.   Nose: Nose normal.   Mouth/Throat: Oropharynx is clear and moist. No oropharyngeal exudate.   Eyes: Pupils are equal, round, and reactive to light. Conjunctivae and EOM are normal. Right eye exhibits no discharge. Left eye exhibits no discharge.   Neck: Normal range of motion. Neck supple. No thyromegaly present.   Cardiovascular: Normal rate, regular rhythm, normal heart sounds and intact distal pulses.   No murmur heard.  Pulmonary/Chest: Effort normal and breath sounds normal. No respiratory distress. She has no wheezes. She exhibits no tenderness.       Abdominal: Soft. Bowel sounds are normal. She exhibits no distension and no mass. There is no tenderness.   Musculoskeletal: Normal range of motion. She exhibits no edema.   Lymphadenopathy:     She has no cervical adenopathy.   Neurological: She is alert and oriented to person, place, and time. No cranial nerve deficit.   Skin: Capillary refill takes less than 2 seconds. She is not diaphoretic.   Psychiatric: She has a normal mood and affect. Her behavior is normal. Judgment and thought content normal.   Nursing note and vitals reviewed.  EKG  normal      Laboratory Reviewed ({Yes)  Lab Results   Component Value Date    WBC 4.32 11/28/2018    HGB 10.9 (L) 11/28/2018    HCT 33.0 (L) 11/28/2018     (H) 11/28/2018    CHOL 166 11/28/2018    TRIG 153 (H) 11/28/2018    HDL 44 11/28/2018    ALT 12 11/28/2018    AST 15 11/28/2018     11/28/2018    K 3.5 11/28/2018     11/28/2018    CREATININE 0.7 11/28/2018    BUN 7 11/28/2018    CO2 27 11/28/2018       Lee was seen today for chest pain.    Diagnoses and all orders for this visit:    Costochondritis  -     EKG 12-lead; Future  -     naproxen (NAPROSYN) 500 MG tablet; Take 1 tablet (500 mg total) by mouth 2 (two) times daily.    Chest pain, unspecified type  -     EKG 12-lead; Future  -     naproxen (NAPROSYN) 500 MG tablet; Take 1 tablet (500 mg total) by mouth 2 (two) times daily.    Chest pain  -     EKG 12-lead; Future  -     naproxen (NAPROSYN) 500 MG tablet; Take 1 tablet (500 mg total) by mouth 2 (two) times daily.                Care Plan/Goals: Reviewed  (No)  Goals    None         Follow up: No follow-ups on file.    After visit summary was printed and given to patient upon discharge today.  Patient goals and care plan are included in After Visit Summary.

## 2020-01-31 DIAGNOSIS — Z12.39 BREAST CANCER SCREENING: ICD-10-CM

## 2020-02-13 ENCOUNTER — TELEPHONE (OUTPATIENT)
Dept: INTERNAL MEDICINE | Facility: CLINIC | Age: 40
End: 2020-02-13

## 2020-02-13 DIAGNOSIS — Z30.9 ENCOUNTER FOR CONTRACEPTIVE MANAGEMENT, UNSPECIFIED TYPE: Primary | ICD-10-CM

## 2020-02-13 NOTE — TELEPHONE ENCOUNTER
----- Message from Rocio Brock sent at 2/13/2020  2:33 PM CST -----  Contact: pt   Stated she's calling to see if the doctor do nexplanon removal, she can be reached at [phone] Thanks

## 2020-02-17 ENCOUNTER — TELEPHONE (OUTPATIENT)
Dept: OBSTETRICS AND GYNECOLOGY | Facility: CLINIC | Age: 40
End: 2020-02-17

## 2020-02-17 ENCOUNTER — TELEPHONE (OUTPATIENT)
Dept: INTERNAL MEDICINE | Facility: CLINIC | Age: 40
End: 2020-02-17

## 2020-02-17 NOTE — TELEPHONE ENCOUNTER
----- Message from Maggie Garcia sent at 2/14/2020  1:59 PM CST -----  Contact: Patient  Patient needs to speak to nurse regarding wanting birth control out of her arm. Please call her back at [phone]. Thank you

## 2020-02-17 NOTE — TELEPHONE ENCOUNTER
Spoke with patient. She wanted to know if they would be able to place another implant the same day. I sent a message to gyn regarding this.

## 2020-02-17 NOTE — TELEPHONE ENCOUNTER
Spoke with patient scheduled for 3/2/20 for contraception consult and WWE. Notified pt of nexplanon process informed that implant will not be removed same day, providers prefer removal and insertions on same day once device is received. Pt canceled appt, states the place that did her insertion usually does removal and insertions same day and does not have to fax off paperwork. Pt states she will call them to schedule and appt. Advised pt if they follow the same process she can contact our office to set up appt. Patient verbalized understanding

## 2020-02-17 NOTE — TELEPHONE ENCOUNTER
----- Message from Jannet Preston MA sent at 2/17/2020  7:45 AM CST -----  Hello,    Patient called regarding getting an implant removed. I got Dr. Ford to put the referral in. She now says that she needs the old one removed and a new one placed. I told her I would see if someone could reach out to her because I am not sure if they do in same day.

## 2020-02-28 LAB
HUMAN PAPILLOMAVIRUS (HPV): NORMAL
PAP RECOMMENDATION EXT: NORMAL

## 2020-06-30 ENCOUNTER — TELEPHONE (OUTPATIENT)
Dept: INTERNAL MEDICINE | Facility: CLINIC | Age: 40
End: 2020-06-30

## 2020-06-30 NOTE — TELEPHONE ENCOUNTER
Called and spoke with patient. She wanted a referral to cards for a follow up on the swelling and still having some pains. I offered her an appointment with our PA to follow up she declined.

## 2020-06-30 NOTE — TELEPHONE ENCOUNTER
----- Message from Symone Simon sent at 6/30/2020 10:37 AM CDT -----  Contact: pt  Pt would like to speak with office for a referral to a heart doctor. [phone]

## 2020-07-07 ENCOUNTER — PATIENT OUTREACH (OUTPATIENT)
Dept: ADMINISTRATIVE | Facility: OTHER | Age: 40
End: 2020-07-07

## 2020-07-07 DIAGNOSIS — R07.9 CHEST PAIN, UNSPECIFIED TYPE: Primary | ICD-10-CM

## 2020-07-07 NOTE — PROGRESS NOTES
Chart reviewed.   Immunizations: Triggered Imm Registry     Orders placed: n/a  Upcoming appts to satisfy DEZ topics: n/a

## 2020-07-08 ENCOUNTER — HOSPITAL ENCOUNTER (OUTPATIENT)
Dept: CARDIOLOGY | Facility: HOSPITAL | Age: 40
Discharge: HOME | End: 2020-07-08
Attending: INTERNAL MEDICINE
Payer: MEDICARE

## 2020-07-08 ENCOUNTER — OFFICE VISIT (OUTPATIENT)
Dept: CARDIOLOGY | Facility: CLINIC | Age: 40
End: 2020-07-08
Payer: MEDICARE

## 2020-07-08 ENCOUNTER — HOSPITAL ENCOUNTER (OUTPATIENT)
Dept: RADIOLOGY | Facility: HOSPITAL | Age: 40
Discharge: HOME | End: 2020-07-08
Attending: INTERNAL MEDICINE
Payer: MEDICARE

## 2020-07-08 VITALS
HEIGHT: 62 IN | HEART RATE: 86 BPM | DIASTOLIC BLOOD PRESSURE: 72 MMHG | OXYGEN SATURATION: 98 % | BODY MASS INDEX: 33.63 KG/M2 | WEIGHT: 182.75 LBS | SYSTOLIC BLOOD PRESSURE: 136 MMHG

## 2020-07-08 DIAGNOSIS — R60.0 EDEMA OF BOTH LEGS: ICD-10-CM

## 2020-07-08 DIAGNOSIS — E66.09 CLASS 1 OBESITY DUE TO EXCESS CALORIES WITHOUT SERIOUS COMORBIDITY WITH BODY MASS INDEX (BMI) OF 33.0 TO 33.9 IN ADULT: ICD-10-CM

## 2020-07-08 DIAGNOSIS — M79.605 PAIN IN BOTH LOWER EXTREMITIES: ICD-10-CM

## 2020-07-08 DIAGNOSIS — R07.9 CHEST PAIN, UNSPECIFIED TYPE: Primary | ICD-10-CM

## 2020-07-08 DIAGNOSIS — R07.89 ATYPICAL CHEST PAIN: ICD-10-CM

## 2020-07-08 DIAGNOSIS — M79.604 PAIN IN BOTH LOWER EXTREMITIES: ICD-10-CM

## 2020-07-08 DIAGNOSIS — R07.9 CHEST PAIN, UNSPECIFIED TYPE: ICD-10-CM

## 2020-07-08 PROBLEM — E66.811 CLASS 1 OBESITY DUE TO EXCESS CALORIES WITHOUT SERIOUS COMORBIDITY WITH BODY MASS INDEX (BMI) OF 33.0 TO 33.9 IN ADULT: Status: ACTIVE | Noted: 2020-07-08

## 2020-07-08 PROCEDURE — 71046 XR CHEST PA AND LATERAL: ICD-10-PCS | Mod: 26,,, | Performed by: RADIOLOGY

## 2020-07-08 PROCEDURE — 93005 ELECTROCARDIOGRAM TRACING: CPT

## 2020-07-08 PROCEDURE — 99999 PR PBB SHADOW E&M-EST. PATIENT-LVL IV: ICD-10-PCS | Mod: PBBFAC,,, | Performed by: INTERNAL MEDICINE

## 2020-07-08 PROCEDURE — 99204 PR OFFICE/OUTPT VISIT, NEW, LEVL IV, 45-59 MIN: ICD-10-PCS | Mod: 25,S$GLB,, | Performed by: INTERNAL MEDICINE

## 2020-07-08 PROCEDURE — 93010 ELECTROCARDIOGRAM REPORT: CPT | Mod: ,,, | Performed by: INTERNAL MEDICINE

## 2020-07-08 PROCEDURE — 93010 EKG 12-LEAD: ICD-10-PCS | Mod: ,,, | Performed by: INTERNAL MEDICINE

## 2020-07-08 PROCEDURE — 71046 X-RAY EXAM CHEST 2 VIEWS: CPT | Mod: TC

## 2020-07-08 PROCEDURE — 99204 OFFICE O/P NEW MOD 45 MIN: CPT | Mod: 25,S$GLB,, | Performed by: INTERNAL MEDICINE

## 2020-07-08 PROCEDURE — 99999 PR PBB SHADOW E&M-EST. PATIENT-LVL IV: CPT | Mod: PBBFAC,,, | Performed by: INTERNAL MEDICINE

## 2020-07-08 PROCEDURE — 71046 X-RAY EXAM CHEST 2 VIEWS: CPT | Mod: 26,,, | Performed by: RADIOLOGY

## 2020-07-08 PROCEDURE — 3008F BODY MASS INDEX DOCD: CPT | Mod: CPTII,S$GLB,, | Performed by: INTERNAL MEDICINE

## 2020-07-08 PROCEDURE — 3008F PR BODY MASS INDEX (BMI) DOCUMENTED: ICD-10-PCS | Mod: CPTII,S$GLB,, | Performed by: INTERNAL MEDICINE

## 2020-07-08 NOTE — PROGRESS NOTES
"Subjective:    Patient ID:  Lee Ayala is a 39 y.o. female who presents for evaluation of Chest Pain and Leg Swelling      Pt referred by Dr. Shameka Ford      HPI  Pt presents for cp.  She saw PCP 10/19 for cp, ecg was normal.  Dx w costochondritis, prescribed NSAIDS.  She called PCP office recently and asked for cardiology referral for cp.  Mid chest pain, daily, "light pain", for some time, at least since last year.  No typical angina.  Not related to po intake.  Can be related to activity.  C/o B LE edema for years and wants to make sure it is not her heart.  States fluid in legs runs in her family.  ecg today NSR, no significant abnl.   Her legs hurt walking, below knees.    History reviewed. No pertinent past medical history.    Current Outpatient Medications:     naproxen (NAPROSYN) 500 MG tablet, Take 1 tablet (500 mg total) by mouth 2 (two) times daily. (Patient not taking: Reported on 7/8/2020), Disp: 40 tablet, Rfl: 0      Review of Systems   Constitution: Negative.   HENT: Negative.    Eyes: Negative.    Cardiovascular: Positive for chest pain and leg swelling.   Respiratory: Negative.    Endocrine: Negative.    Hematologic/Lymphatic: Negative.    Skin: Negative.    Musculoskeletal: Negative.    Gastrointestinal: Negative.    Genitourinary: Negative.    Neurological: Negative.    Psychiatric/Behavioral: Negative.    Allergic/Immunologic: Negative.        /72 (BP Location: Right arm, Patient Position: Sitting, BP Method: Large (Manual))   Pulse 86   Ht 5' 2" (1.575 m)   Wt 82.9 kg (182 lb 12.2 oz)   SpO2 98%   BMI 33.43 kg/m²     Wt Readings from Last 3 Encounters:   07/08/20 82.9 kg (182 lb 12.2 oz)   10/17/19 79.2 kg (174 lb 9.7 oz)   07/12/19 76 kg (167 lb 8.8 oz)     Temp Readings from Last 3 Encounters:   10/17/19 96.3 °F (35.7 °C) (Tympanic)   07/08/19 97.3 °F (36.3 °C) (Tympanic)   11/28/18 96.1 °F (35.6 °C) (Tympanic)     BP Readings from Last 3 Encounters: "   07/08/20 136/72   10/17/19 118/78   07/12/19 (!) 143/89     Pulse Readings from Last 3 Encounters:   07/08/20 86   10/17/19 88   07/12/19 70          Objective:    Physical Exam   Constitutional: She is oriented to person, place, and time. Vital signs are normal. She appears well-developed and well-nourished. She is active and cooperative. She does not have a sickly appearance. She does not appear ill. No distress.   HENT:   Head: Normocephalic.   Neck: Neck supple. Normal carotid pulses, no hepatojugular reflux and no JVD present. Carotid bruit is not present. No thyromegaly present.   Cardiovascular: Normal rate, regular rhythm, S1 normal, S2 normal, normal heart sounds and normal pulses. PMI is not displaced. Exam reveals no gallop and no friction rub.   No murmur heard.  Pulses:       Radial pulses are 2+ on the right side and 2+ on the left side.   Pulmonary/Chest: Effort normal and breath sounds normal. She has no wheezes. She has no rales.   Abdominal: Soft. Normal appearance, normal aorta and bowel sounds are normal. She exhibits no pulsatile liver, no abdominal bruit, no ascites and no mass. There is no splenomegaly or hepatomegaly. There is no abdominal tenderness.   obese   Musculoskeletal:         General: Edema present.   Lymphadenopathy:     She has no cervical adenopathy.   Neurological: She is alert and oriented to person, place, and time.   Skin: Skin is warm. She is not diaphoretic.   Psychiatric: She has a normal mood and affect. Her behavior is normal.   Nursing note and vitals reviewed.      I have reviewed all pertinent labs and cardiac studies.    No results found for: TSH        Chemistry        Component Value Date/Time     11/28/2018 1050    K 3.5 11/28/2018 1050     11/28/2018 1050    CO2 27 11/28/2018 1050    BUN 7 11/28/2018 1050    CREATININE 0.7 11/28/2018 1050    GLU 95 11/28/2018 1050        Component Value Date/Time    CALCIUM 9.1 11/28/2018 1050    ALKPHOS 50 (L)  11/28/2018 1050    AST 15 11/28/2018 1050    ALT 12 11/28/2018 1050    BILITOT 0.8 11/28/2018 1050    ESTGFRAFRICA >60.0 11/28/2018 1050    EGFRNONAA >60.0 11/28/2018 1050        No results found for: JGMFSDUO02  Lab Results   Component Value Date    WBC 4.32 11/28/2018    HGB 10.9 (L) 11/28/2018    HCT 33.0 (L) 11/28/2018    MCV 97 11/28/2018     (H) 11/28/2018       No results found for: LABA1C, HGBA1C  Lab Results   Component Value Date    CHOL 166 11/28/2018     Lab Results   Component Value Date    HDL 44 11/28/2018     Lab Results   Component Value Date    LDLCALC 91.4 11/28/2018     Lab Results   Component Value Date    TRIG 153 (H) 11/28/2018     Lab Results   Component Value Date    CHOLHDL 26.5 11/28/2018           Assessment:       1. Chest pain, unspecified type    2. Atypical chest pain    3. Edema of both legs    4. Class 1 obesity due to excess calories without serious comorbidity with body mass index (BMI) of 33.0 to 33.9 in adult    5. Pain in both lower extremities         Plan:             Atypical cp sxs, unclear etiology but could be due to costochondritis as it was in past.  Chronic leg edema.  Check labs -- CBC, CMP, TSH, BNP.  Echo  Stress test  B LE vascular studies.  CXR.  Low salt diet discussed.  Weight loss needed.  Daily exercise.  Phone review.

## 2020-07-10 ENCOUNTER — TELEPHONE (OUTPATIENT)
Dept: CARDIOLOGY | Facility: CLINIC | Age: 40
End: 2020-07-10

## 2020-07-10 NOTE — TELEPHONE ENCOUNTER
Please call pt.   CXR no acute findings.  F/u with tests scheduled (stress test, echo, etc).    Dr Gould

## 2020-07-10 NOTE — TELEPHONE ENCOUNTER
----- Message from Evin Ly LPN sent at 7/10/2020  1:52 PM CDT -----  Regarding: CXR results  Contact: self  Patient calling for CXR results.  States that she's still having Chest Pains.  Please advise.  ----- Message -----  From: Renard Camarena  Sent: 7/10/2020   1:40 PM CDT  To: Luis Fernando [facility] Staff    Type:  Test Results    Who Called: Lee Ayala   Name of Test (Lab/Mammo/Etc): Chest Xray  Date of Test: 7/8/2020  Ordering Provider: Dr. Gould  Where the test was performed: [facility]  Would the patient rather a call back or a response via [facility]? Call back  Best Call Back Number: [phone]  Additional Information:  Pt is exp chest pain through the day

## 2020-07-15 ENCOUNTER — TELEPHONE (OUTPATIENT)
Dept: CARDIOLOGY | Facility: CLINIC | Age: 40
End: 2020-07-15

## 2020-07-20 ENCOUNTER — TELEPHONE (OUTPATIENT)
Dept: CARDIOLOGY | Facility: CLINIC | Age: 40
End: 2020-07-20

## 2020-07-20 NOTE — TELEPHONE ENCOUNTER
Returned patient's call, patient wanted to reschedule her appt that was canceled appt scheduled for 7/22/2020

## 2020-07-21 ENCOUNTER — TELEPHONE (OUTPATIENT)
Dept: CARDIOLOGY | Facility: CLINIC | Age: 40
End: 2020-07-21

## 2020-07-21 ENCOUNTER — PATIENT OUTREACH (OUTPATIENT)
Dept: ADMINISTRATIVE | Facility: OTHER | Age: 40
End: 2020-07-21

## 2020-07-21 NOTE — TELEPHONE ENCOUNTER
Spoke with patient to advise her that none of her tests are fasting.  Patient verbalized understanding.

## 2020-07-21 NOTE — TELEPHONE ENCOUNTER
Please call pt.   She has appt tomorrow but needs to do her tests first then see me.  I just saw her earlier this month and needs her tests done first.  Schedule f/u appt after her tests which are scheduled 7/24/20.      Dr Gould

## 2020-08-04 ENCOUNTER — TELEPHONE (OUTPATIENT)
Dept: CARDIOLOGY | Facility: CLINIC | Age: 40
End: 2020-08-04

## 2020-08-04 NOTE — TELEPHONE ENCOUNTER
patient called to reschedule due to flat tire, patient was advise of next available appt. Which was 08/5/2020  10/28/2020  Patient said she couldn't make it for 8/5/2020 and that she was going to give us a back when she can find a convenient time.

## 2020-08-05 ENCOUNTER — TELEPHONE (OUTPATIENT)
Dept: CARDIOLOGY | Facility: CLINIC | Age: 40
End: 2020-08-05

## 2020-08-05 ENCOUNTER — TELEPHONE (OUTPATIENT)
Dept: INTERNAL MEDICINE | Facility: CLINIC | Age: 40
End: 2020-08-05

## 2020-08-05 NOTE — TELEPHONE ENCOUNTER
Called patient back patient wanted to see if she can reschedule her appt. She canceled yesterday due to having a flat tire. Advise her that there was nothing else available before her appt. In October. Patient verbally understood and kept her appt.

## 2020-08-05 NOTE — TELEPHONE ENCOUNTER
----- Message from Ana Watson sent at 8/5/2020 10:58 AM CDT -----  Regarding: appt  Contact: Lee  Type:  Sooner Apoointment Request    Caller is requesting a sooner appointment.  Caller declined first available appointment listed below.  Caller will not accept being placed on the waitlist and is requesting a message be sent to doctor.  Name of Caller:Lee Ayala  When is the first available appointment?08/20/20  Symptoms:pain all over   Would the patient rather a call back or a response via [facility]? Call back   Best Call Back Number:[phone]  Additional Information:

## 2020-08-17 ENCOUNTER — TELEPHONE (OUTPATIENT)
Dept: CARDIOLOGY | Facility: CLINIC | Age: 40
End: 2020-08-17

## 2020-08-17 NOTE — TELEPHONE ENCOUNTER
----- Message from Negra Hayes sent at 8/17/2020  3:53 PM CDT -----  Regarding: test  Good evening,      Pt would like a call back to r/s test and can be reached at [phone]      Thanks,  Negra Hayes

## 2020-08-31 ENCOUNTER — TELEPHONE (OUTPATIENT)
Dept: CARDIOLOGY | Facility: HOSPITAL | Age: 40
End: 2020-08-31

## 2020-08-31 NOTE — TELEPHONE ENCOUNTER
----- Message from Rocio Brock sent at 8/31/2020 11:59 AM CDT -----  Regarding: Appointment  Contact: pt  Stated she has a few question about her up coming appointment, she can be reached at [phone] Thanks

## 2020-09-01 ENCOUNTER — HOSPITAL ENCOUNTER (OUTPATIENT)
Dept: CARDIOLOGY | Facility: HOSPITAL | Age: 40
Discharge: HOME | End: 2020-09-01
Attending: INTERNAL MEDICINE
Payer: MEDICARE

## 2020-09-01 VITALS
HEIGHT: 62 IN | BODY MASS INDEX: 33.49 KG/M2 | BODY MASS INDEX: 33.49 KG/M2 | WEIGHT: 182 LBS | WEIGHT: 182 LBS | HEIGHT: 62 IN

## 2020-09-01 VITALS
DIASTOLIC BLOOD PRESSURE: 90 MMHG | SYSTOLIC BLOOD PRESSURE: 128 MMHG | BODY MASS INDEX: 33.49 KG/M2 | WEIGHT: 182 LBS | HEIGHT: 62 IN

## 2020-09-01 VITALS
DIASTOLIC BLOOD PRESSURE: 69 MMHG | WEIGHT: 182 LBS | SYSTOLIC BLOOD PRESSURE: 126 MMHG | HEIGHT: 62 IN | BODY MASS INDEX: 33.49 KG/M2

## 2020-09-01 DIAGNOSIS — M79.604 PAIN IN BOTH LOWER EXTREMITIES: ICD-10-CM

## 2020-09-01 DIAGNOSIS — M79.605 PAIN IN BOTH LOWER EXTREMITIES: ICD-10-CM

## 2020-09-01 DIAGNOSIS — R07.9 CHEST PAIN, UNSPECIFIED TYPE: ICD-10-CM

## 2020-09-01 DIAGNOSIS — E66.09 CLASS 1 OBESITY DUE TO EXCESS CALORIES WITHOUT SERIOUS COMORBIDITY WITH BODY MASS INDEX (BMI) OF 33.0 TO 33.9 IN ADULT: ICD-10-CM

## 2020-09-01 DIAGNOSIS — R60.0 EDEMA OF BOTH LEGS: ICD-10-CM

## 2020-09-01 DIAGNOSIS — R07.89 ATYPICAL CHEST PAIN: ICD-10-CM

## 2020-09-01 LAB
AORTIC ROOT ANNULUS: 2.41 CM
AV INDEX (PROSTH): 0.68
AV MEAN GRADIENT: 5 MMHG
AV PEAK GRADIENT: 10 MMHG
AV VALVE AREA: 1.93 CM2
AV VELOCITY RATIO: 0.67
BSA FOR ECHO PROCEDURE: 1.9 M2
CV ECHO LV RWT: 0.4 CM
CV STRESS BASE HR: 90 BPM
DIASTOLIC BLOOD PRESSURE: 69 MMHG
DOP CALC AO PEAK VEL: 1.62 M/S
DOP CALC AO VTI: 28.96 CM
DOP CALC LVOT AREA: 2.8 CM2
DOP CALC LVOT DIAMETER: 1.9 CM
DOP CALC LVOT PEAK VEL: 1.09 M/S
DOP CALC LVOT STROKE VOLUME: 55.88 CM3
DOP CALC RVOT PEAK VEL: 1.04 M/S
DOP CALC RVOT VTI: 21.7 CM
DOP CALCLVOT PEAK VEL VTI: 19.72 CM
E WAVE DECELERATION TIME: 176.29 MSEC
E/A RATIO: 1.42
E/E' RATIO: 8.36 M/S
ECHO LV POSTERIOR WALL: 0.96 CM (ref 0.6–1.1)
FRACTIONAL SHORTENING: 27 % (ref 28–44)
INTERVENTRICULAR SEPTUM: 1 CM (ref 0.6–1.1)
IVRT: 103.81 MSEC
LA MAJOR: 4.03 CM
LA MINOR: 3.52 CM
LA WIDTH: 3.63 CM
LEFT ANT TIBIAL SYS PSV: 59 CM/S
LEFT ATRIUM SIZE: 3.41 CM
LEFT ATRIUM VOLUME INDEX: 21.5 ML/M2
LEFT ATRIUM VOLUME: 39.54 CM3
LEFT CFA PSV: 171 CM/S
LEFT INTERNAL DIMENSION IN SYSTOLE: 3.5 CM (ref 2.1–4)
LEFT PERONEAL SYS PSV: 65 CM/S
LEFT POPLITEAL PSV: 62 CM/S
LEFT POST TIBIAL SYS PSV: 67 CM/S
LEFT PROFUNDA SYS PSV: 119 CM/S
LEFT SUPER FEMORAL DIST SYS PSV: 104 CM/S
LEFT SUPER FEMORAL MID SYS PSV: 126 CM/S
LEFT SUPER FEMORAL OSTIAL SYS PSV: 102 CM/S
LEFT SUPER FEMORAL PROX SYS PSV: 106 CM/S
LEFT TIB/PER TRUNK SYS PSV: 89 CM/S
LEFT VENTRICLE DIASTOLIC VOLUME INDEX: 58.27 ML/M2
LEFT VENTRICLE DIASTOLIC VOLUME: 107 ML
LEFT VENTRICLE MASS INDEX: 90 G/M2
LEFT VENTRICLE SYSTOLIC VOLUME INDEX: 27.7 ML/M2
LEFT VENTRICLE SYSTOLIC VOLUME: 50.86 ML
LEFT VENTRICULAR INTERNAL DIMENSION IN DIASTOLE: 4.79 CM (ref 3.5–6)
LEFT VENTRICULAR MASS: 165.04 G
LV LATERAL E/E' RATIO: 6.57 M/S
LV SEPTAL E/E' RATIO: 11.5 M/S
MV PEAK A VEL: 0.65 M/S
MV PEAK E VEL: 0.92 M/S
MV STENOSIS PRESSURE HALF TIME: 51.13 MS
MV VALVE AREA P 1/2 METHOD: 4.3 CM2
OHS CV CPX 1 MINUTE RECOVERY HEART RATE: 133 BPM
OHS CV CPX 85 PERCENT MAX PREDICTED HEART RATE MALE: 144
OHS CV CPX ESTIMATED METS: 7
OHS CV CPX MAX PREDICTED HEART RATE: 170
OHS CV CPX PATIENT IS FEMALE: 1
OHS CV CPX PATIENT IS MALE: 0
OHS CV CPX PEAK DIASTOLIC BLOOD PRESSURE: 61 MMHG
OHS CV CPX PEAK HEAR RATE: 160 BPM
OHS CV CPX PEAK RATE PRESSURE PRODUCT: NORMAL
OHS CV CPX PEAK SYSTOLIC BLOOD PRESSURE: 160 MMHG
OHS CV CPX PERCENT MAX PREDICTED HEART RATE ACHIEVED: 94
OHS CV CPX RATE PRESSURE PRODUCT PRESENTING: NORMAL
OHS CV LEFT LOWER EXTREMITY ABI (NO CALC): 1.1
OHS CV RIGHT ABI LOWER EXTREMITY (NO CALC): 1
PISA TR MAX VEL: 2.03 M/S
PULM VEIN S/D RATIO: 1.92
PV MEAN GRADIENT: 2.87 MMHG
PV PEAK D VEL: 0.36 M/S
PV PEAK S VEL: 0.69 M/S
PV PEAK VELOCITY: 1.03 CM/S
RA MAJOR: 3.58 CM
RA PRESSURE: 3 MMHG
RA WIDTH: 3.61 CM
RIGHT ANT TIBIAL SYS PSV: 69 CM/S
RIGHT CFA PSV: 183 CM/S
RIGHT PERONEAL SYS PSV: 66 CM/S
RIGHT POPLITEAL PSV: 60 CM/S
RIGHT POST TIBIAL SYS PSV: 73 CM/S
RIGHT PROFUNDA SYS PSV: 107 CM/S
RIGHT SUPER FEMORAL DIST SYS PSV: 90 CM/S
RIGHT SUPER FEMORAL MID SYS PSV: 118 CM/S
RIGHT SUPER FEMORAL OSTIAL SYS PSV: 117 CM/S
RIGHT SUPER FEMORAL PROX SYS PSV: 130 CM/S
RIGHT TIB/PER TRUNK SYS PSV: 64 CM/S
RIGHT VENTRICULAR END-DIASTOLIC DIMENSION: 3.45 CM
SINUS: 2.42 CM
STJ: 2.43 CM
STRESS ECHO POST EXERCISE DUR MIN: 5 MINUTES
STRESS ECHO POST EXERCISE DUR SEC: 1 SECONDS
SYSTOLIC BLOOD PRESSURE: 126 MMHG
TDI LATERAL: 0.14 M/S
TDI SEPTAL: 0.08 M/S
TDI: 0.11 M/S
TR MAX PG: 16 MMHG
TRICUSPID ANNULAR PLANE SYSTOLIC EXCURSION: 2.29 CM
TV REST PULMONARY ARTERY PRESSURE: 19 MMHG

## 2020-09-01 PROCEDURE — 93925 LOWER EXTREMITY STUDY: CPT | Mod: 50

## 2020-09-01 PROCEDURE — 93016 EXERCISE STRESS - EKG (CUPID ONLY): ICD-10-PCS | Mod: ,,, | Performed by: INTERNAL MEDICINE

## 2020-09-01 PROCEDURE — 93306 TTE W/DOPPLER COMPLETE: CPT

## 2020-09-01 PROCEDURE — 93016 CV STRESS TEST SUPVJ ONLY: CPT | Mod: ,,, | Performed by: INTERNAL MEDICINE

## 2020-09-01 PROCEDURE — 93970 EXTREMITY STUDY: CPT | Mod: 26,,, | Performed by: INTERNAL MEDICINE

## 2020-09-01 PROCEDURE — 93970 CV US DOPPLER VENOUS LEGS BILATERAL (CUPID ONLY): ICD-10-PCS | Mod: 26,,, | Performed by: INTERNAL MEDICINE

## 2020-09-01 PROCEDURE — 93306 ECHO (CUPID ONLY): ICD-10-PCS | Mod: 26,,, | Performed by: INTERNAL MEDICINE

## 2020-09-01 PROCEDURE — 93925 CV US DOPPLER ARTERIAL LEGS BILATERAL (CUPID ONLY): ICD-10-PCS | Mod: 26,,, | Performed by: INTERNAL MEDICINE

## 2020-09-01 PROCEDURE — 93970 EXTREMITY STUDY: CPT | Mod: 50

## 2020-09-01 PROCEDURE — 93925 LOWER EXTREMITY STUDY: CPT | Mod: 26,,, | Performed by: INTERNAL MEDICINE

## 2020-09-01 PROCEDURE — 93017 CV STRESS TEST TRACING ONLY: CPT

## 2020-09-01 PROCEDURE — 93306 TTE W/DOPPLER COMPLETE: CPT | Mod: 26,,, | Performed by: INTERNAL MEDICINE

## 2020-09-01 PROCEDURE — 93018 CV STRESS TEST I&R ONLY: CPT | Mod: ,,, | Performed by: INTERNAL MEDICINE

## 2020-09-01 PROCEDURE — 93018 EXERCISE STRESS - EKG (CUPID ONLY): ICD-10-PCS | Mod: ,,, | Performed by: INTERNAL MEDICINE

## 2020-09-02 ENCOUNTER — TELEPHONE (OUTPATIENT)
Dept: CARDIOLOGY | Facility: HOSPITAL | Age: 40
End: 2020-09-02

## 2020-09-03 NOTE — TELEPHONE ENCOUNTER
Please call pt.  She passed her stress test.    No blockages noted.  Echo shows normal heart strength/function.  B LE vascular studies: no blood clots and no blockages noted.  F/u as needed.    Dr Gould

## 2020-09-03 NOTE — TELEPHONE ENCOUNTER
Called patient to advise of results per Dr. Gould   Please call pt.  She passed her stress test.    No blockages noted.  Echo shows normal heart strength/function.  B LE vascular studies: no blood clots and no blockages noted.  F/u as needed.     Patient verbally understood

## 2020-11-30 ENCOUNTER — TELEPHONE (OUTPATIENT)
Dept: INTERNAL MEDICINE | Facility: CLINIC | Age: 40
End: 2020-11-30

## 2020-11-30 NOTE — TELEPHONE ENCOUNTER
----- Message from Patrice Nieves sent at 11/30/2020 12:36 PM CST -----  Contact: JERMAINE PICKENS [[id number]]  Type:  Needs Medical Advice    Who Called: JERMAINE PICKENS [[id number]]  Symptoms (please be specific):   How long has patient had these symptoms:    Pharmacy name and phone #:    Would the patient rather a call back or a response via [facility]? Call    Best Call Back Number:  [phone] (home)    Additional Information: Pt is requesting a call back from the nurse in regards to the pt knowing if the Dr  has received her info please

## 2020-11-30 NOTE — TELEPHONE ENCOUNTER
----- Message from Jackeline Preston sent at 11/30/2020  9:43 AM CST -----  Contact: pt  The pt request a return call, no additional info given and can be reached at [phone]///[id number]

## 2020-11-30 NOTE — TELEPHONE ENCOUNTER
Called and spoke with patient. She said she is trying to get pregnant. She does have an implant that she would need to get removed with OBGYN. She said she did look into adopting but it was a lot of steps and a long process. She said that she doesn't have a partner. This is something that she wants to do on her on. She wanted to know if you had any recommendations or suggestions. Please Advise

## 2020-11-30 NOTE — TELEPHONE ENCOUNTER
Called and spoke with patient.  Patient said [facility] but they didn't have any appointments until next year some time.

## 2020-12-01 ENCOUNTER — PATIENT OUTREACH (OUTPATIENT)
Dept: ADMINISTRATIVE | Facility: OTHER | Age: 40
End: 2020-12-01

## 2020-12-02 ENCOUNTER — PATIENT OUTREACH (OUTPATIENT)
Dept: ADMINISTRATIVE | Facility: OTHER | Age: 40
End: 2020-12-02

## 2020-12-02 NOTE — PROGRESS NOTES
Health Maintenance Due   Topic Date Due    Hepatitis C Screening  1980    HIV Screening  11/02/1993    Cervical Cancer Screening  08/03/2018    Mammogram  11/02/2018    TETANUS VACCINE  02/02/2020    Influenza Vaccine (1) 08/01/2020     Updates were requested from care everywhere.  Chart was reviewed for overdue [facility] Encounters ([facility]) topics (CRS, Breast Cancer Screening, Eye exam)  Health Maintenance has been updated.  LINKS immunization registry triggered.  Immunizations were reconciled.

## 2020-12-22 ENCOUNTER — TELEPHONE (OUTPATIENT)
Dept: OBSTETRICS AND GYNECOLOGY | Facility: CLINIC | Age: 40
End: 2020-12-22

## 2020-12-22 NOTE — TELEPHONE ENCOUNTER
----- Message from Salma Clay sent at 12/22/2020 10:40 AM CST -----  Regarding: sanya  .Type:  Sooner Apoointment Request    Caller is requesting a sooner appointment.  Caller declined first available appointment listed below.  Caller will not accept being placed on the waitlist and is requesting a message be sent to doctor.  Name of Caller:pt  When is the first available appointment? 12/23  Symptoms: mammo and Implant removal   Would the patient rather a call back or a response via [facility]?  Call back   Best Call Back Number:[phone] (home)   Additional Information:  pt request appts back to back

## 2020-12-22 NOTE — TELEPHONE ENCOUNTER
Called patient and she will call back after first of the year to reschedule mammo and IUD removal.

## 2021-02-11 ENCOUNTER — PATIENT OUTREACH (OUTPATIENT)
Dept: ADMINISTRATIVE | Facility: HOSPITAL | Age: 41
End: 2021-02-11

## 2021-02-25 ENCOUNTER — PATIENT OUTREACH (OUTPATIENT)
Dept: ADMINISTRATIVE | Facility: HOSPITAL | Age: 41
End: 2021-02-25

## 2021-04-16 ENCOUNTER — IMMUNIZATION (OUTPATIENT)
Dept: INTERNAL MEDICINE | Facility: CLINIC | Age: 41
End: 2021-04-16
Payer: MEDICARE

## 2021-04-16 DIAGNOSIS — Z23 NEED FOR VACCINATION: Primary | ICD-10-CM

## 2021-04-16 PROCEDURE — 91300 COVID-19, MRNA, LNP-S, PF, 30 MCG/0.3 ML DOSE VACCINE: CPT | Mod: ,,, | Performed by: FAMILY MEDICINE

## 2021-04-16 PROCEDURE — 91300 COVID-19, MRNA, LNP-S, PF, 30 MCG/0.3 ML DOSE VACCINE: ICD-10-PCS | Mod: ,,, | Performed by: FAMILY MEDICINE

## 2021-04-16 PROCEDURE — 0001A COVID-19, MRNA, LNP-S, PF, 30 MCG/0.3 ML DOSE VACCINE: ICD-10-PCS | Mod: CV19,,, | Performed by: FAMILY MEDICINE

## 2021-04-16 PROCEDURE — 0001A COVID-19, MRNA, LNP-S, PF, 30 MCG/0.3 ML DOSE VACCINE: CPT | Mod: CV19,,, | Performed by: FAMILY MEDICINE

## 2021-05-14 ENCOUNTER — IMMUNIZATION (OUTPATIENT)
Dept: INTERNAL MEDICINE | Facility: CLINIC | Age: 41
End: 2021-05-14
Payer: MEDICARE

## 2021-05-14 DIAGNOSIS — Z23 NEED FOR VACCINATION: Primary | ICD-10-CM

## 2021-05-14 PROCEDURE — 0002A COVID-19, MRNA, LNP-S, PF, 30 MCG/0.3 ML DOSE VACCINE: CPT | Mod: PBBFAC | Performed by: FAMILY MEDICINE

## 2021-05-14 PROCEDURE — 91300 COVID-19, MRNA, LNP-S, PF, 30 MCG/0.3 ML DOSE VACCINE: CPT | Mod: PBBFAC | Performed by: FAMILY MEDICINE

## 2021-10-13 ENCOUNTER — TELEPHONE (OUTPATIENT)
Dept: FAMILY MEDICINE | Facility: CLINIC | Age: 41
End: 2021-10-13

## 2022-05-03 ENCOUNTER — TELEPHONE (OUTPATIENT)
Dept: ADMINISTRATIVE | Facility: HOSPITAL | Age: 42
End: 2022-05-03
Payer: MEDICARE

## 2022-05-06 ENCOUNTER — TELEPHONE (OUTPATIENT)
Dept: INTERNAL MEDICINE | Facility: CLINIC | Age: 42
End: 2022-05-06
Payer: MEDICARE

## 2022-05-06 NOTE — TELEPHONE ENCOUNTER
----- Message from Berta Garcia sent at 5/6/2022  8:10 AM CDT -----  Contact: self  Lee Ayala would like a call back at [phone], pt states that she was supposed to receive a call to schedule an appointment.

## 2022-05-12 ENCOUNTER — LAB VISIT (OUTPATIENT)
Dept: LAB | Facility: HOSPITAL | Age: 42
End: 2022-05-12
Payer: MEDICARE

## 2022-05-12 ENCOUNTER — OFFICE VISIT (OUTPATIENT)
Dept: INTERNAL MEDICINE | Facility: CLINIC | Age: 42
End: 2022-05-12
Payer: MEDICARE

## 2022-05-12 VITALS
HEIGHT: 62 IN | BODY MASS INDEX: 32.3 KG/M2 | DIASTOLIC BLOOD PRESSURE: 84 MMHG | TEMPERATURE: 98 F | HEART RATE: 90 BPM | SYSTOLIC BLOOD PRESSURE: 126 MMHG | WEIGHT: 175.5 LBS | OXYGEN SATURATION: 99 %

## 2022-05-12 DIAGNOSIS — E66.09 CLASS 1 OBESITY DUE TO EXCESS CALORIES WITHOUT SERIOUS COMORBIDITY WITH BODY MASS INDEX (BMI) OF 33.0 TO 33.9 IN ADULT: ICD-10-CM

## 2022-05-12 DIAGNOSIS — R60.0 EDEMA OF BOTH LEGS: ICD-10-CM

## 2022-05-12 DIAGNOSIS — Z13.6 ENCOUNTER FOR SCREENING FOR CARDIOVASCULAR DISORDERS: ICD-10-CM

## 2022-05-12 DIAGNOSIS — Z12.31 SCREENING MAMMOGRAM FOR BREAST CANCER: ICD-10-CM

## 2022-05-12 DIAGNOSIS — Z00.00 ANNUAL PHYSICAL EXAM: ICD-10-CM

## 2022-05-12 DIAGNOSIS — Z00.00 ANNUAL PHYSICAL EXAM: Primary | ICD-10-CM

## 2022-05-12 LAB
ALBUMIN SERPL BCP-MCNC: 3.8 G/DL (ref 3.5–5.2)
ALP SERPL-CCNC: 51 U/L (ref 55–135)
ALT SERPL W/O P-5'-P-CCNC: 7 U/L (ref 10–44)
ANION GAP SERPL CALC-SCNC: 12 MMOL/L (ref 8–16)
AST SERPL-CCNC: 13 U/L (ref 10–40)
BASOPHILS # BLD AUTO: 0.02 K/UL (ref 0–0.2)
BASOPHILS NFR BLD: 0.5 % (ref 0–1.9)
BILIRUB SERPL-MCNC: 1 MG/DL (ref 0.1–1)
BUN SERPL-MCNC: 7 MG/DL (ref 6–20)
CALCIUM SERPL-MCNC: 9.4 MG/DL (ref 8.7–10.5)
CHLORIDE SERPL-SCNC: 102 MMOL/L (ref 95–110)
CHOLEST SERPL-MCNC: 166 MG/DL (ref 120–199)
CHOLEST/HDLC SERPL: 3.5 {RATIO} (ref 2–5)
CO2 SERPL-SCNC: 25 MMOL/L (ref 23–29)
CREAT SERPL-MCNC: 0.8 MG/DL (ref 0.5–1.4)
DIFFERENTIAL METHOD: ABNORMAL
EOSINOPHIL # BLD AUTO: 0 K/UL (ref 0–0.5)
EOSINOPHIL NFR BLD: 0.8 % (ref 0–8)
ERYTHROCYTE [DISTWIDTH] IN BLOOD BY AUTOMATED COUNT: 11.9 % (ref 11.5–14.5)
EST. GFR  (AFRICAN AMERICAN): >60 ML/MIN/1.73 M^2
EST. GFR  (NON AFRICAN AMERICAN): >60 ML/MIN/1.73 M^2
GLUCOSE SERPL-MCNC: 84 MG/DL (ref 70–110)
HCT VFR BLD AUTO: 35.1 % (ref 37–48.5)
HDLC SERPL-MCNC: 47 MG/DL (ref 40–75)
HDLC SERPL: 28.3 % (ref 20–50)
HGB BLD-MCNC: 11.1 G/DL (ref 12–16)
IMM GRANULOCYTES # BLD AUTO: 0.01 K/UL (ref 0–0.04)
IMM GRANULOCYTES NFR BLD AUTO: 0.3 % (ref 0–0.5)
LDLC SERPL CALC-MCNC: 92.2 MG/DL (ref 63–159)
LYMPHOCYTES # BLD AUTO: 1.3 K/UL (ref 1–4.8)
LYMPHOCYTES NFR BLD: 32.5 % (ref 18–48)
MCH RBC QN AUTO: 31.9 PG (ref 27–31)
MCHC RBC AUTO-ENTMCNC: 31.6 G/DL (ref 32–36)
MCV RBC AUTO: 101 FL (ref 82–98)
MONOCYTES # BLD AUTO: 0.4 K/UL (ref 0.3–1)
MONOCYTES NFR BLD: 9.1 % (ref 4–15)
NEUTROPHILS # BLD AUTO: 2.2 K/UL (ref 1.8–7.7)
NEUTROPHILS NFR BLD: 56.8 % (ref 38–73)
NONHDLC SERPL-MCNC: 119 MG/DL
NRBC BLD-RTO: 0 /100 WBC
PLATELET # BLD AUTO: 326 K/UL (ref 150–450)
PMV BLD AUTO: 10.2 FL (ref 9.2–12.9)
POTASSIUM SERPL-SCNC: 3.6 MMOL/L (ref 3.5–5.1)
PROT SERPL-MCNC: 7.3 G/DL (ref 6–8.4)
RBC # BLD AUTO: 3.48 M/UL (ref 4–5.4)
SODIUM SERPL-SCNC: 139 MMOL/L (ref 136–145)
TRIGL SERPL-MCNC: 134 MG/DL (ref 30–150)
TSH SERPL DL<=0.005 MIU/L-ACNC: 1.03 UIU/ML (ref 0.4–4)
WBC # BLD AUTO: 3.85 K/UL (ref 3.9–12.7)

## 2022-05-12 PROCEDURE — 3079F DIAST BP 80-89 MM HG: CPT | Mod: CPTII,S$GLB,,

## 2022-05-12 PROCEDURE — 99999 PR PBB SHADOW E&M-EST. PATIENT-LVL III: ICD-10-PCS | Mod: PBBFAC,,,

## 2022-05-12 PROCEDURE — 1160F PR REVIEW ALL MEDS BY PRESCRIBER/CLIN PHARMACIST DOCUMENTED: ICD-10-PCS | Mod: CPTII,S$GLB,,

## 2022-05-12 PROCEDURE — 3079F PR MOST RECENT DIASTOLIC BLOOD PRESSURE 80-89 MM HG: ICD-10-PCS | Mod: CPTII,S$GLB,,

## 2022-05-12 PROCEDURE — 3008F BODY MASS INDEX DOCD: CPT | Mod: CPTII,S$GLB,,

## 2022-05-12 PROCEDURE — 36415 COLL VENOUS BLD VENIPUNCTURE: CPT

## 2022-05-12 PROCEDURE — 3008F PR BODY MASS INDEX (BMI) DOCUMENTED: ICD-10-PCS | Mod: CPTII,S$GLB,,

## 2022-05-12 PROCEDURE — 99999 PR PBB SHADOW E&M-EST. PATIENT-LVL III: CPT | Mod: PBBFAC,,,

## 2022-05-12 PROCEDURE — 1159F MED LIST DOCD IN RCRD: CPT | Mod: CPTII,S$GLB,,

## 2022-05-12 PROCEDURE — 99214 OFFICE O/P EST MOD 30 MIN: CPT | Mod: S$GLB,,,

## 2022-05-12 PROCEDURE — 3074F SYST BP LT 130 MM HG: CPT | Mod: CPTII,S$GLB,,

## 2022-05-12 PROCEDURE — 99214 PR OFFICE/OUTPT VISIT, EST, LEVL IV, 30-39 MIN: ICD-10-PCS | Mod: S$GLB,,,

## 2022-05-12 PROCEDURE — 85025 COMPLETE CBC W/AUTO DIFF WBC: CPT

## 2022-05-12 PROCEDURE — 80053 COMPREHEN METABOLIC PANEL: CPT

## 2022-05-12 PROCEDURE — 80061 LIPID PANEL: CPT

## 2022-05-12 PROCEDURE — 3074F PR MOST RECENT SYSTOLIC BLOOD PRESSURE < 130 MM HG: ICD-10-PCS | Mod: CPTII,S$GLB,,

## 2022-05-12 PROCEDURE — 84443 ASSAY THYROID STIM HORMONE: CPT

## 2022-05-12 PROCEDURE — 1160F RVW MEDS BY RX/DR IN RCRD: CPT | Mod: CPTII,S$GLB,,

## 2022-05-12 PROCEDURE — 1159F PR MEDICATION LIST DOCUMENTED IN MEDICAL RECORD: ICD-10-PCS | Mod: CPTII,S$GLB,,

## 2022-05-12 RX ORDER — HYDROCHLOROTHIAZIDE 25 MG/1
25 TABLET ORAL DAILY
Qty: 30 TABLET | Refills: 1 | Status: SHIPPED | OUTPATIENT
Start: 2022-05-12 | End: 2022-07-20

## 2022-05-12 NOTE — PROGRESS NOTES
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 Not sure which one and it did not work     History:  No past medical history on file.  No past surgical history on file.  No family history on file.  Social History     Socioeconomic History    Marital status: Single   Tobacco Use    Smoking status: Never Smoker    Smokeless tobacco: Never Used   Substance and Sexual Activity    Alcohol use: No    Drug use: No    Sexual activity: Never     Patient Active Problem List   Diagnosis    Atypical chest pain    Edema of both legs    Class 1 obesity due to excess calories without serious comorbidity with body mass index (BMI) of 33.0 to 33.9 in adult    Pain in both lower extremities     Review of patient's allergies indicates:  No Known Allergies    The following were reviewed at this visit: active problem list, medication list, allergies, family history, social history, and health maintenance.    Medications:  Current Outpatient Medications on File Prior to Visit   Medication Sig Dispense Refill    naproxen (NAPROSYN) 500 MG tablet Take 1 tablet (500 mg total) by mouth 2 (two) times daily. (Patient not taking: Reported on 7/8/2020) 40 tablet 0     No current facility-administered medications on file prior to visit.       Medications have been reviewed and reconciled with patient at this visit.  Barriers to medications reviewed with patient.    Adverse reactions to current medications reviewed with patient..    Over  the counter medications reviewed and reconciled with patient.    Exam:  Wt Readings from Last 3 Encounters:   09/01/20 82.6 kg (182 lb)   09/01/20 82.6 kg (182 lb)   09/01/20 82.6 kg (182 lb)     Temp Readings from Last 3 Encounters:   10/17/19 96.3 °F (35.7 °C) (Tympanic)   07/08/19 97.3 °F (36.3 °C) (Tympanic)   11/28/18 96.1 °F (35.6 °C) (Tympanic)     BP Readings from Last 3 Encounters:   09/01/20 (!) 128/90   09/01/20 126/69   07/08/20 136/72     Pulse Readings from Last 3 Encounters:   07/08/20 86   10/17/19 88   07/12/19 70     There is no height or weight on file to calculate BMI.      Review of Systems   Constitutional: Negative.  Negative for chills and fever.   HENT: Negative.  Negative for congestion, sinus pain and sore throat.    Eyes: Negative for blurred vision and double vision.   Respiratory: Negative for cough, sputum production, shortness of breath and wheezing.    Cardiovascular: Positive for leg swelling. Negative for chest pain and palpitations.   Gastrointestinal: Negative for abdominal pain, constipation, diarrhea, heartburn, nausea and vomiting.   Genitourinary: Negative.    Musculoskeletal: Negative.    Skin: Negative.  Negative for rash.   Neurological: Negative.    Endo/Heme/Allergies: Negative.  Negative for polydipsia. Does not bruise/bleed easily.   Psychiatric/Behavioral: Negative for depression and substance abuse.     Physical Exam  Vitals and nursing note reviewed.   Constitutional:       General: She is not in acute distress.     Appearance: She is well-developed. She is obese. She is not diaphoretic.   HENT:      Head: Normocephalic and atraumatic.      Right Ear: External ear normal.      Left Ear: External ear normal.      Nose: Nose normal.   Eyes:      General:         Right eye: No discharge.         Left eye: No discharge.      Conjunctiva/sclera: Conjunctivae normal.      Pupils: Pupils are equal, round, and reactive to light.   Neck:      Thyroid: No thyromegaly.    Cardiovascular:      Rate and Rhythm: Normal rate and regular rhythm.      Pulses: Normal pulses.           Dorsalis pedis pulses are 2+ on the right side and 2+ on the left side.      Heart sounds: Normal heart sounds. No murmur heard.  Pulmonary:      Effort: Pulmonary effort is normal. No respiratory distress.      Breath sounds: Normal breath sounds. No wheezing.   Abdominal:      General: Bowel sounds are normal.   Musculoskeletal:         General: Swelling present.      Cervical back: Normal range of motion and neck supple.      Right ankle: Swelling present. No tenderness. Normal range of motion.      Left ankle: Swelling present. No tenderness. Normal range of motion.   Lymphadenopathy:      Cervical: No cervical adenopathy.   Skin:     General: Skin is warm and dry.      Capillary Refill: Capillary refill takes less than 2 seconds.   Neurological:      Mental Status: She is alert and oriented to person, place, and time.      Cranial Nerves: No cranial nerve deficit.   Psychiatric:         Behavior: Behavior normal.         Thought Content: Thought content normal.         Judgment: Judgment normal.         Laboratory Reviewed ({No)  Lab Results   Component Value Date    WBC 4.16 07/08/2020    HGB 11.2 (L) 07/08/2020    HCT 35.5 (L) 07/08/2020     (H) 07/08/2020    CHOL 166 11/28/2018    TRIG 153 (H) 11/28/2018    HDL 44 11/28/2018    ALT 8 (L) 07/08/2020    AST 11 07/08/2020     07/08/2020    K 3.4 (L) 07/08/2020     07/08/2020    CREATININE 0.8 07/08/2020    BUN 7 07/08/2020    CO2 30 (H) 07/08/2020    TSH 0.905 07/08/2020       Lee was seen today for annual exam.    Diagnoses and all orders for this visit:    Annual physical exam  -     CBC Auto Differential; Future  -     TSH; Future  -     Lipid Panel; Future  -     Comprehensive Metabolic Panel; Future    Screening mammogram for breast cancer  -     Mammo Digital Screening Bilat w/ Jaciel; Future    Class 1 obesity due to excess  calories without serious comorbidity with body mass index (BMI) of 33.0 to 33.9 in adult  -     CBC Auto Differential; Future  -     TSH; Future  -     Lipid Panel; Future  -     Comprehensive Metabolic Panel; Future    Edema of both legs  -     CBC Auto Differential; Future  -     TSH; Future  -     Comprehensive Metabolic Panel; Future  -     hydroCHLOROthiazide (HYDRODIURIL) 25 MG tablet; Take 1 tablet (25 mg total) by mouth once daily.    Encounter for screening for cardiovascular disorders  -     CBC Auto Differential; Future  -     TSH; Future  -     Lipid Panel; Future  -     Comprehensive Metabolic Panel; Future    Will get labs and schedule MMG  Had Pap at [facility] unit  GLADIS signed during visit     Will call or send [employer] message to inform about bilateral ankle edema       Care Plan/Goals: Reviewed    Goals    None         Follow up: No follow-ups on file.    After visit summary was printed and given to patient upon discharge today.  Patient goals and care plan are included in After Visit Summary.

## 2022-05-13 ENCOUNTER — HOSPITAL ENCOUNTER (OUTPATIENT)
Dept: RADIOLOGY | Facility: HOSPITAL | Age: 42
Discharge: HOME | End: 2022-05-13
Payer: MEDICARE

## 2022-05-13 VITALS — HEIGHT: 62 IN | BODY MASS INDEX: 32.3 KG/M2 | WEIGHT: 175.5 LBS

## 2022-05-13 DIAGNOSIS — Z12.31 SCREENING MAMMOGRAM FOR BREAST CANCER: ICD-10-CM

## 2022-05-13 PROCEDURE — 77063 MAMMO DIGITAL SCREENING BILAT WITH TOMO: ICD-10-PCS | Mod: 26,,, | Performed by: RADIOLOGY

## 2022-05-13 PROCEDURE — 77067 MAMMO DIGITAL SCREENING BILAT WITH TOMO: ICD-10-PCS | Mod: 26,,, | Performed by: RADIOLOGY

## 2022-05-13 PROCEDURE — 77067 SCR MAMMO BI INCL CAD: CPT | Mod: 26,,, | Performed by: RADIOLOGY

## 2022-05-13 PROCEDURE — 77063 BREAST TOMOSYNTHESIS BI: CPT | Mod: 26,,, | Performed by: RADIOLOGY

## 2022-05-13 PROCEDURE — 77063 BREAST TOMOSYNTHESIS BI: CPT | Mod: TC

## 2022-05-24 ENCOUNTER — PATIENT OUTREACH (OUTPATIENT)
Dept: ADMINISTRATIVE | Facility: HOSPITAL | Age: 42
End: 2022-05-24
Payer: MEDICARE

## 2022-05-24 NOTE — Clinical Note
Received call from [facility] health unit nurse. Patient has not completed papsmear since 2015 at facility

## 2022-05-31 NOTE — PROGRESS NOTES
2nd req-GLADIS uploaded in media and faxed to [facility] for Pap Smear [phone]

## 2022-06-07 NOTE — PROGRESS NOTES
3rd Req-Called [facility] about pap. LM with staff to have nurse return call to f/u on GLADIS. Will refax GLADIS. 1 wk f/u set

## 2022-06-07 NOTE — PROGRESS NOTES
Received call from [facility] health unit nurse. Patient has not completed papsmear since 2015 at facility. Sent to LPN-CC

## 2022-09-09 ENCOUNTER — TELEPHONE (OUTPATIENT)
Dept: INTERNAL MEDICINE | Facility: CLINIC | Age: 42
End: 2022-09-09
Payer: MEDICARE

## 2022-09-09 NOTE — TELEPHONE ENCOUNTER
----- Message from Hetal Gavin sent at 9/9/2022  9:32 AM CDT -----  Contact: Self/[phone]  Pt is calling in regards to medication (hydroCHLOROthiazide (HYDRODIURIL) 25 MG tablet) , she states that she feels naseaus after taking medication, stiff neck, dizzy, and headaches; medication is helping with swelling. Please give her a call back at [phone]. Thank you s/g

## 2022-09-09 NOTE — TELEPHONE ENCOUNTER
Spoke with patient. She said this is new. She said she will hold meds for a few days to see if it resolves.

## 2022-09-09 NOTE — TELEPHONE ENCOUNTER
Called and spoke with patient. Patient states that she has been having stiff neck headaches had some dizziness with the HZTC. She said that it helps for the swelling. Please Advise

## 2022-09-23 ENCOUNTER — TELEPHONE (OUTPATIENT)
Dept: ADMINISTRATIVE | Facility: HOSPITAL | Age: 42
End: 2022-09-23
Payer: MEDICARE

## 2022-10-03 ENCOUNTER — PES CALL (OUTPATIENT)
Dept: ADMINISTRATIVE | Facility: CLINIC | Age: 42
End: 2022-10-03
Payer: MEDICARE

## 2022-10-18 ENCOUNTER — PATIENT OUTREACH (OUTPATIENT)
Dept: ADMINISTRATIVE | Facility: HOSPITAL | Age: 42
End: 2022-10-18
Payer: MEDICARE

## 2023-01-27 NOTE — TELEPHONE ENCOUNTER
Spoke with patient to advise her that CXR showed no acute findings.  F/u with tests scheduled (stress test, echo, etc).  Denies questions/concerns.   Home

## 2023-02-09 ENCOUNTER — TELEPHONE (OUTPATIENT)
Dept: INTERNAL MEDICINE | Facility: CLINIC | Age: 43
End: 2023-02-09
Payer: MEDICAID

## 2023-02-09 ENCOUNTER — HOSPITAL ENCOUNTER (EMERGENCY)
Facility: HOSPITAL | Age: 43
Discharge: LEFT BEFORE DISCHARGE | End: 2023-02-09
Attending: EMERGENCY MEDICINE
Payer: MEDICARE

## 2023-02-09 VITALS
BODY MASS INDEX: 30.83 KG/M2 | HEART RATE: 77 BPM | DIASTOLIC BLOOD PRESSURE: 76 MMHG | TEMPERATURE: 97 F | SYSTOLIC BLOOD PRESSURE: 119 MMHG | RESPIRATION RATE: 29 BRPM | OXYGEN SATURATION: 100 % | WEIGHT: 168.56 LBS

## 2023-02-09 DIAGNOSIS — R07.9 CHEST PAIN: ICD-10-CM

## 2023-02-09 DIAGNOSIS — E87.6 HYPOKALEMIA: Primary | ICD-10-CM

## 2023-02-09 DIAGNOSIS — Z53.29 LEFT AGAINST MEDICAL ADVICE: ICD-10-CM

## 2023-02-09 DIAGNOSIS — D64.9 ANEMIA, UNSPECIFIED TYPE: ICD-10-CM

## 2023-02-09 LAB
ALBUMIN SERPL BCP-MCNC: 3.5 G/DL (ref 3.5–5.2)
ALP SERPL-CCNC: 51 U/L (ref 55–135)
ALT SERPL W/O P-5'-P-CCNC: 15 U/L (ref 10–44)
ANION GAP SERPL CALC-SCNC: 11 MMOL/L (ref 8–16)
AST SERPL-CCNC: 14 U/L (ref 10–40)
BASOPHILS # BLD AUTO: 0.02 K/UL (ref 0–0.2)
BASOPHILS NFR BLD: 0.5 % (ref 0–1.9)
BILIRUB SERPL-MCNC: 0.7 MG/DL (ref 0.1–1)
BNP SERPL-MCNC: <10 PG/ML (ref 0–99)
BUN SERPL-MCNC: 9 MG/DL (ref 6–20)
CALCIUM SERPL-MCNC: 9.3 MG/DL (ref 8.7–10.5)
CHLORIDE SERPL-SCNC: 97 MMOL/L (ref 95–110)
CO2 SERPL-SCNC: 30 MMOL/L (ref 23–29)
CREAT SERPL-MCNC: 0.8 MG/DL (ref 0.5–1.4)
DIFFERENTIAL METHOD: ABNORMAL
EOSINOPHIL # BLD AUTO: 0 K/UL (ref 0–0.5)
EOSINOPHIL NFR BLD: 1 % (ref 0–8)
ERYTHROCYTE [DISTWIDTH] IN BLOOD BY AUTOMATED COUNT: 12 % (ref 11.5–14.5)
EST. GFR  (NO RACE VARIABLE): >60 ML/MIN/1.73 M^2
GLUCOSE SERPL-MCNC: 99 MG/DL (ref 70–110)
HCT VFR BLD AUTO: 30.5 % (ref 37–48.5)
HCV AB SERPL QL IA: NEGATIVE
HEP C VIRUS HOLD SPECIMEN: NORMAL
HGB BLD-MCNC: 10.3 G/DL (ref 12–16)
HIV 1+2 AB+HIV1 P24 AG SERPL QL IA: NEGATIVE
IMM GRANULOCYTES # BLD AUTO: 0.01 K/UL (ref 0–0.04)
IMM GRANULOCYTES NFR BLD AUTO: 0.3 % (ref 0–0.5)
LYMPHOCYTES # BLD AUTO: 1.3 K/UL (ref 1–4.8)
LYMPHOCYTES NFR BLD: 33.8 % (ref 18–48)
MCH RBC QN AUTO: 32.3 PG (ref 27–31)
MCHC RBC AUTO-ENTMCNC: 33.8 G/DL (ref 32–36)
MCV RBC AUTO: 96 FL (ref 82–98)
MONOCYTES # BLD AUTO: 0.4 K/UL (ref 0.3–1)
MONOCYTES NFR BLD: 9.5 % (ref 4–15)
NEUTROPHILS # BLD AUTO: 2.1 K/UL (ref 1.8–7.7)
NEUTROPHILS NFR BLD: 54.9 % (ref 38–73)
NRBC BLD-RTO: 0 /100 WBC
PLATELET # BLD AUTO: 331 K/UL (ref 150–450)
PMV BLD AUTO: 9 FL (ref 9.2–12.9)
POTASSIUM SERPL-SCNC: 3 MMOL/L (ref 3.5–5.1)
PROT SERPL-MCNC: 7.1 G/DL (ref 6–8.4)
RBC # BLD AUTO: 3.19 M/UL (ref 4–5.4)
SODIUM SERPL-SCNC: 138 MMOL/L (ref 136–145)
TROPONIN I SERPL DL<=0.01 NG/ML-MCNC: 0.01 NG/ML (ref 0–0.03)
TSH SERPL DL<=0.005 MIU/L-ACNC: 1.12 UIU/ML (ref 0.4–4)
WBC # BLD AUTO: 3.9 K/UL (ref 3.9–12.7)

## 2023-02-09 PROCEDURE — 87389 HIV-1 AG W/HIV-1&-2 AB AG IA: CPT | Performed by: EMERGENCY MEDICINE

## 2023-02-09 PROCEDURE — 25000003 PHARM REV CODE 250: Performed by: REGISTERED NURSE

## 2023-02-09 PROCEDURE — 93005 ELECTROCARDIOGRAM TRACING: CPT

## 2023-02-09 PROCEDURE — 84484 ASSAY OF TROPONIN QUANT: CPT | Performed by: REGISTERED NURSE

## 2023-02-09 PROCEDURE — 85025 COMPLETE CBC W/AUTO DIFF WBC: CPT | Performed by: REGISTERED NURSE

## 2023-02-09 PROCEDURE — 83880 ASSAY OF NATRIURETIC PEPTIDE: CPT | Performed by: REGISTERED NURSE

## 2023-02-09 PROCEDURE — 84443 ASSAY THYROID STIM HORMONE: CPT | Performed by: REGISTERED NURSE

## 2023-02-09 PROCEDURE — 99285 EMERGENCY DEPT VISIT HI MDM: CPT | Mod: 25

## 2023-02-09 PROCEDURE — 86803 HEPATITIS C AB TEST: CPT | Performed by: EMERGENCY MEDICINE

## 2023-02-09 PROCEDURE — 80053 COMPREHEN METABOLIC PANEL: CPT | Performed by: REGISTERED NURSE

## 2023-02-09 PROCEDURE — 25000003 PHARM REV CODE 250: Performed by: EMERGENCY MEDICINE

## 2023-02-09 PROCEDURE — 93010 ELECTROCARDIOGRAM REPORT: CPT | Mod: ,,, | Performed by: INTERNAL MEDICINE

## 2023-02-09 PROCEDURE — 93010 EKG 12-LEAD: ICD-10-PCS | Mod: ,,, | Performed by: INTERNAL MEDICINE

## 2023-02-09 RX ORDER — ASPIRIN 325 MG
325 TABLET ORAL
Status: COMPLETED | OUTPATIENT
Start: 2023-02-09 | End: 2023-02-09

## 2023-02-09 RX ORDER — POTASSIUM CHLORIDE 20 MEQ/1
40 TABLET, EXTENDED RELEASE ORAL
Status: COMPLETED | OUTPATIENT
Start: 2023-02-09 | End: 2023-02-09

## 2023-02-09 RX ORDER — MAG HYDROX/ALUMINUM HYD/SIMETH 200-200-20
30 SUSPENSION, ORAL (FINAL DOSE FORM) ORAL ONCE
Status: COMPLETED | OUTPATIENT
Start: 2023-02-09 | End: 2023-02-09

## 2023-02-09 RX ORDER — ASPIRIN 325 MG
TABLET ORAL
Status: DISCONTINUED
Start: 2023-02-09 | End: 2023-02-09 | Stop reason: HOSPADM

## 2023-02-09 RX ORDER — LIDOCAINE HYDROCHLORIDE 20 MG/ML
15 SOLUTION OROPHARYNGEAL ONCE
Status: COMPLETED | OUTPATIENT
Start: 2023-02-09 | End: 2023-02-09

## 2023-02-09 RX ADMIN — LIDOCAINE HYDROCHLORIDE 15 ML: 20 SOLUTION ORAL at 01:02

## 2023-02-09 RX ADMIN — POTASSIUM CHLORIDE 40 MEQ: 1500 TABLET, EXTENDED RELEASE ORAL at 01:02

## 2023-02-09 RX ADMIN — ALUMINUM HYDROXIDE, MAGNESIUM HYDROXIDE, AND DIMETHICONE 30 ML: 200; 20; 200 SUSPENSION ORAL at 01:02

## 2023-02-09 RX ADMIN — ASPIRIN 325 MG ORAL TABLET 325 MG: 325 PILL ORAL at 11:02

## 2023-02-09 NOTE — ED PROVIDER NOTES
SCRIBE #1 NOTE: I, Melissa Ayala, am scribing for, and in the presence of, Jennifer Barreto DO. I have scribed the entire note.      History      Chief Complaint   Patient presents with    Chest Pain     Intermittent palpitations with chest pain        Review of patient's allergies indicates:  No Known Allergies     HPI   HPI    2/9/2023, 12:18 PM   History obtained from the patient      History of Present Illness: Lee Ayala is a 42 y.o. female patient who presents to the Emergency Department for mid-sternal CP which onset at 2200 last night. Pt describes her pain as a pressure and states that her pain does not radiate. Symptoms are constant and moderate in severity. No mitigating or exacerbating factors reported. Associated sxs include chest pressure, bilateral leg swelling, and chest tightness. Patient denies any heavy vaginal bleeding, N/V/D, SOB, abdominal pain, blood in stool, melena, diaphoresis, and all other sxs at this time. No prior Tx reported. Pt is on a diuretic. Pt denies having a history of DVT, DM, PE, tobacco abuse, or heart disease. Pt also denies ever having a stress test or cardiac catheterization. No further complaints or concerns at this time.         Arrival mode: Personal vehicle    PCP: Shameka Ford MD       Past Medical History:  Positive for hypertension.    Patient denies any known history of coronary artery disease, diabetes, hyperlipidemia, PE, DVT    Past Surgical History:  No past surgical history on file.      Family History:  No family history on file.    Social History:  Social History     Tobacco Use    Smoking status: Never    Smokeless tobacco: Never   Substance and Sexual Activity    Alcohol use: No    Drug use: No    Sexual activity: Never       ROS   Review of Systems   Constitutional:  Negative for diaphoresis and fever.   HENT:  Negative for sore throat.    Respiratory:  Positive for chest tightness. Negative for shortness of breath.     Cardiovascular:  Positive for chest pain (mid-sternal) and leg swelling (bilateral).        (+) chest pressure   Gastrointestinal:  Negative for abdominal pain, blood in stool, diarrhea, nausea and vomiting.        (-) melena   Genitourinary:  Negative for dysuria and vaginal bleeding (heavy).   Musculoskeletal:  Negative for back pain.   Skin:  Negative for rash.   Neurological:  Negative for weakness.   Hematological:  Does not bruise/bleed easily.   All other systems reviewed and are negative.    Physical Exam      Initial Vitals [02/09/23 0915]   BP Pulse Resp Temp SpO2   (!) 120/58 82 18 97.4 °F (36.3 °C) 99 %      MAP       --          Physical Exam  Nursing Notes and Vital Signs Reviewed.  Constitutional: Patient is in no acute distress. Well-developed and well-nourished.  Head: Atraumatic. Normocephalic.  Eyes: PERRL. EOM intact. Conjunctivae are not pale. No scleral icterus.  ENT: Mucous membranes are moist. Oropharynx is clear and symmetric.    Neck: Supple. Full ROM. No lymphadenopathy.  Cardiovascular: Regular rate. Regular rhythm. No murmurs, rubs, or gallops. Distal pulses are 2+ and symmetric.  Pulmonary/Chest: No respiratory distress. Clear to auscultation bilaterally. No wheezing or rales.  Abdominal: Soft and non-distended.  There is no tenderness.  No rebound, guarding, or rigidity. Good bowel sounds.  Genitourinary: No CVA tenderness  Musculoskeletal: Moves all extremities. No obvious deformities. 1+ pedal edema to the legs bilaterally.  Skin: Warm and dry.  Neurological:  Alert, awake, and appropriate.  Normal speech.  No acute focal neurological deficits are appreciated.  Psychiatric: Normal affect. Good eye contact. Appropriate in content.    ED Course    Procedures  ED Vital Signs:  Vitals:    02/09/23 0915 02/09/23 1134   BP: (!) 120/58 131/69   Pulse: 82 76   Resp: 18 20   Temp: 97.4 °F (36.3 °C)    TempSrc: Oral    SpO2: 99%    Weight: 76.5 kg (168 lb 8.7 oz)        Abnormal Lab  Results:  Labs Reviewed   CBC W/ AUTO DIFFERENTIAL - Abnormal; Notable for the following components:       Result Value    RBC 3.19 (*)     Hemoglobin 10.3 (*)     Hematocrit 30.5 (*)     MCH 32.3 (*)     MPV 9.0 (*)     All other components within normal limits    Narrative:     Release to patient->Immediate   COMPREHENSIVE METABOLIC PANEL - Abnormal; Notable for the following components:    Potassium 3.0 (*)     CO2 30 (*)     Alkaline Phosphatase 51 (*)     All other components within normal limits    Narrative:     Release to patient->Immediate   TROPONIN I    Narrative:     Release to patient->Immediate   B-TYPE NATRIURETIC PEPTIDE    Narrative:     Release to patient->Immediate   TSH    Narrative:     Release to patient->Immediate   HIV 1 / 2 ANTIBODY    Narrative:     Release to patient->Immediate   HEPATITIS C ANTIBODY    Narrative:     Release to patient->Immediate   HEP C VIRUS HOLD SPECIMEN    Narrative:     Release to patient->Immediate   TROPONIN I        All Lab Results:  Results for orders placed or performed during the hospital encounter of 02/09/23   CBC auto differential   Result Value Ref Range    WBC 3.90 3.90 - 12.70 K/uL    RBC 3.19 (L) 4.00 - 5.40 M/uL    Hemoglobin 10.3 (L) 12.0 - 16.0 g/dL    Hematocrit 30.5 (L) 37.0 - 48.5 %    MCV 96 82 - 98 fL    MCH 32.3 (H) 27.0 - 31.0 pg    MCHC 33.8 32.0 - 36.0 g/dL    RDW 12.0 11.5 - 14.5 %    Platelets 331 150 - 450 K/uL    MPV 9.0 (L) 9.2 - 12.9 fL    Immature Granulocytes 0.3 0.0 - 0.5 %    Gran # (ANC) 2.1 1.8 - 7.7 K/uL    Immature Grans (Abs) 0.01 0.00 - 0.04 K/uL    Lymph # 1.3 1.0 - 4.8 K/uL    Mono # 0.4 0.3 - 1.0 K/uL    Eos # 0.0 0.0 - 0.5 K/uL    Baso # 0.02 0.00 - 0.20 K/uL    nRBC 0 0 /100 WBC    Gran % 54.9 38.0 - 73.0 %    Lymph % 33.8 18.0 - 48.0 %    Mono % 9.5 4.0 - 15.0 %    Eosinophil % 1.0 0.0 - 8.0 %    Basophil % 0.5 0.0 - 1.9 %    Differential Method Automated    Comprehensive metabolic panel   Result Value Ref Range     Sodium 138 136 - 145 mmol/L    Potassium 3.0 (L) 3.5 - 5.1 mmol/L    Chloride 97 95 - 110 mmol/L    CO2 30 (H) 23 - 29 mmol/L    Glucose 99 70 - 110 mg/dL    BUN 9 6 - 20 mg/dL    Creatinine 0.8 0.5 - 1.4 mg/dL    Calcium 9.3 8.7 - 10.5 mg/dL    Total Protein 7.1 6.0 - 8.4 g/dL    Albumin 3.5 3.5 - 5.2 g/dL    Total Bilirubin 0.7 0.1 - 1.0 mg/dL    Alkaline Phosphatase 51 (L) 55 - 135 U/L    AST 14 10 - 40 U/L    ALT 15 10 - 44 U/L    Anion Gap 11 8 - 16 mmol/L    eGFR >60 >60 mL/min/1.73 m^2   Troponin I #1   Result Value Ref Range    Troponin I 0.012 0.000 - 0.026 ng/mL   BNP   Result Value Ref Range    BNP <10 0 - 99 pg/mL   TSH   Result Value Ref Range    TSH 1.117 0.400 - 4.000 uIU/mL   HIV 1/2 Ag/Ab (4th Gen)   Result Value Ref Range    HIV 1/2 Ag/Ab Negative Negative   Hepatitis C Antibody   Result Value Ref Range    Hepatitis C Ab Negative Negative   HCV Virus Hold Specimen   Result Value Ref Range    HEP C Virus Hold Specimen Hold for HCV sendout          Imaging Results:  Imaging Results              X-Ray Chest AP Portable (Final result)  Result time 02/09/23 10:30:13      Final result by Ariel Gabriel MD (02/09/23 10:30:13)                   Impression:      1.  Negative for acute process involving the chest.    2.  Stable findings as noted above.      Electronically signed by: Ariel Gabriel MD  Date:    02/09/2023  Time:    10:30               Narrative:    EXAMINATION:  XR CHEST AP PORTABLE    CLINICAL HISTORY:  Chest Pain;    COMPARISON:  July 8, 2020    FINDINGS:  The lungs are clear. The cardiac silhouette size is normal. The trachea is midline and the mediastinal width is normal. Negative for focal infiltrate, effusion or pneumothorax. Pulmonary vasculature is normal. Negative for osseous abnormalities. Convex right curvature of the lower thoracic spine.  Mildly tortuous aorta                                     1:07 PM: Per ED physician's independent interpretation, pt's CXR results: Normal  cardiac silhouette, no pneumothorax, and no infiltrate.    The EKG was ordered, reviewed, and independently interpreted by the ED provider.  Interpretation time: 9:12  Rate: 76 BPM  Rhythm: normal sinus rhythm  Interpretation: Nonspecific T wave changes. Normal axis. No STEMI.         The Emergency Provider reviewed the vital signs and test results, which are outlined above.    ED Discussion     1:26 PM: Pt is A&O x3, appropriate, and of capacity at this time. Pt voices desire to leave hospital as she has to  her children. D/w pt in length need for further evaluation and treatment due to HPI and PEx. Pt declines any further evaluation or tx at this time. All risks, including worsening sx, permanent bodily harm and death, were discussed in length. Pt acknowledges all risk at this time and agrees to sign AMA form. Pt given RTER instructions. All questions and concerns addressed at this time. Pt leaving AMA.       ED Course as of 02/09/23 1329   Thu Feb 09, 2023   1326 Discussed heart score pathway.  Patient advised that she needed to leave the emergency department to  daughter.  Patient elected to sign out against medical advice.  Patient was invited to return to emergency department at any time [LB]      ED Course User Index  [LB] Jennifer Barreto, DO       ED Medication(s):  Medications   aspirin 325 MG tablet (has no administration in time range)   aluminum-magnesium hydroxide-simethicone 200-200-20 mg/5 mL suspension 30 mL (has no administration in time range)     And   LIDOcaine HCl 2% oral solution 15 mL (has no administration in time range)   potassium chloride SA CR tablet 40 mEq (has no administration in time range)   aspirin tablet 325 mg (325 mg Oral Given 2/9/23 1132)     New Prescriptions    No medications on file        Follow-up Information       Shameka Ford MD In 2 days.    Specialty: Family Medicine  Why: Return to emergency department for chest pain, chest pressure, shortness of  breath, difficulty breathing, weakness, or other concerns  Contact information:  [address] [facility] [address]  [phone]                               Medical Decision Making    Medical Decision Making:   Clinical Tests:   Lab Tests: Ordered and Reviewed  Radiological Study: Ordered and Reviewed  Medical Tests: Ordered and Reviewed          Medical Decision Making     Discussed with Independent Historian/Old Records: [x] No.   [] Yes -  see prior documentation.    Comorbid Medical Conditions Considered:  HTN      Differential Diagnoses: Based on the available information and the initial assessment, the working DIFFERENTIAL DIAGNOSIS considered during this evaluation included, but not limited to:          Acute Coronary Syndrome and Gastritis,GERD                  Xrays:  INDEPENDENT ORDERED and  REVIEWED: [x] Yes       [] No     []  N/A                              INDEPENDENTLY  INTERPRETED: [x] Yes, see prior documentation      [] No    []  N/A    EKG:    INDEPENDENT ORDERED and  REVIEWED and INDEPENDENTLY INTERPRETED   :  Yes, see previous documentation.    Notable Abnormal Lab:  Notable abnormal findings from our INDEPENDENT REVIEW of ORDERED LABS include: Hemoglobin/Hematocrit     Latest Reference Range & Units 07/08/20 15:20 05/12/22 10:17 02/09/23 11:28   Hemoglobin 12.0 - 16.0 g/dL 11.2 (L) 11.1 (L) 10.3 (L)   Hematocrit 37.0 - 48.5 % 35.5 (L) 35.1 (L) 30.5 (L)   (L): Data is abnormally low     Latest Reference Range & Units 02/09/23 11:28   Potassium 3.5 - 5.1 mmol/L 3.0 (L)   (L): Data is abnormally low    Social Determinates: Factored in the Treatment and Disposition of patient included:  None    ED Course:  Patient had EKG performed.  No ST segment elevation noted.  Patient was given aspirin.  Patient noted to have low potassium. Hemoglobin at baseline.  Patient given aspirin, GI cocktail and replacement potassium.  Initial troponin negative.  Recommended a 2nd troponin as to  follow the heart score pathway.  Patient could not stay in hospital, she needed to  her daughter.  Requested to sign out against medical advice.    Discussed case with:N/A    Scribe Attestation:   Scribe #1: I performed the above scribed service and the documentation accurately describes the services I performed. I attest to the accuracy of the note.    Attending:   Physician Attestation Statement for Scribe #1: I, Jennifer Barreto DO, personally performed the services described in this documentation, as scribed by Melissa Ayala, in my presence, and it is both accurate and complete.          Clinical Impression       ICD-10-CM ICD-9-CM   1. Hypokalemia  E87.6 276.8   2. Chest pain  R07.9 786.50   3. Left against medical advice  Z53.29 V64.2   4. Anemia, unspecified type  D64.9 285.9       Disposition:   Disposition: AMA  AMA: Patient left AMA after: MD assigned, HPI, exam, lab drawn, x-rays done, lab resulted and x-ray viewed. Patient refused: lab. Patient status: competent, oriented x 3 and not intoxicated. AMA Form: signed by patient        Jennifer Barreto DO  02/09/23 5725

## 2023-02-09 NOTE — TELEPHONE ENCOUNTER
----- Message from Chante Martinez sent at 2/9/2023  4:08 PM CST -----  Contact: Lee Howard would like a call back at [phone] in regards to getting in touch with office about her ER visit today 2/9/23. Patient would like to know if the office can see information from visit and what is her next step.  Thanks   Am

## 2023-02-09 NOTE — FIRST PROVIDER EVALUATION
Medical screening examination initiated.  I have conducted a focused provider triage encounter, findings are as follows:    Brief history of present illness:  Chest pain and palpitations that began last night    There were no vitals filed for this visit.    Pertinent physical exam:  No acute distress, patient alert and oriented    Brief workup plan:  Workup and further evaluation    Preliminary workup initiated; this workup will be continued and followed by the physician or advanced practice provider that is assigned to the patient when roomed.

## 2023-02-09 NOTE — TELEPHONE ENCOUNTER
Called and spoke with patient. Patient has been having active chest pains since last night. Advised closest ER to her. Patient verbalized understanding.

## 2023-02-09 NOTE — TELEPHONE ENCOUNTER
----- Message from Lulú Solomon sent at 2/9/2023  7:16 AM CST -----  Type:  Same Day Appointment Request    Caller is requesting a same day appointment.  Caller declined first available appointment listed below.    Name of Caller: Lee Ayala   When is the first available appointment? 03/09/2023  Symptoms: chest pains  Best Call Back Number: [phone]  Additional Information:  pt was advised 911 or ER but refused

## 2023-02-09 NOTE — LETTER
Patient: Lee Ayala  YOB: 1980  Date: 2/9/2023 Time: 1:24 PM  Location: [facility] Dept.    Leaving the Hospital Against Medical Advice    Chart #:[id number]    This will certify that I, the undersigned,    ______________________________________________________________________    A patient in the above named medical center, having requested discharge and removal from the medical [place] against the advice of my attending physician(s), hereby release [facility], its physicians, officers and employees, severally and individually, from any and all liability of any nature whatsoever for any injury or harm or complication of any kind that may result directly or indirectly, by reason of my terminating my stay as a patient at [facility]. and my departure from [facility], and hereby waive any and all rights of action I may now have or later acquire as a result of my voluntary departure from [facility] and the termination of my stay as a patient therein.    She or her authorized caregiver has been informed and understands the inherent risks, including death, permanent disability, loss of current lifestyle.    This release is made with the full knowledge of the danger that may result from the action which I am taking.      Date:_______________________                         ___________________________                                                                                    Patient/Legal Representative    Witness:        ____________________________                          ___________________________  Nurse                                                                        Physician

## 2023-02-10 ENCOUNTER — OFFICE VISIT (OUTPATIENT)
Dept: INTERNAL MEDICINE | Facility: CLINIC | Age: 43
End: 2023-02-10
Payer: MEDICARE

## 2023-02-10 ENCOUNTER — LAB VISIT (OUTPATIENT)
Dept: LAB | Facility: HOSPITAL | Age: 43
End: 2023-02-10
Payer: MEDICARE

## 2023-02-10 VITALS
DIASTOLIC BLOOD PRESSURE: 62 MMHG | RESPIRATION RATE: 18 BRPM | SYSTOLIC BLOOD PRESSURE: 104 MMHG | BODY MASS INDEX: 30.6 KG/M2 | TEMPERATURE: 97 F | HEART RATE: 84 BPM | OXYGEN SATURATION: 99 % | WEIGHT: 167.31 LBS

## 2023-02-10 DIAGNOSIS — E66.9 OBESITY (BMI 30-39.9): ICD-10-CM

## 2023-02-10 DIAGNOSIS — R79.9 ABNORMAL FINDING OF BLOOD CHEMISTRY, UNSPECIFIED: ICD-10-CM

## 2023-02-10 DIAGNOSIS — E87.6 HYPOKALEMIA: ICD-10-CM

## 2023-02-10 DIAGNOSIS — R07.9 CHEST PAIN, UNSPECIFIED TYPE: Primary | ICD-10-CM

## 2023-02-10 DIAGNOSIS — R60.0 EDEMA OF BOTH LOWER LEGS: ICD-10-CM

## 2023-02-10 DIAGNOSIS — D64.9 ANEMIA, UNSPECIFIED TYPE: ICD-10-CM

## 2023-02-10 DIAGNOSIS — R07.89 ATYPICAL CHEST PAIN: Primary | ICD-10-CM

## 2023-02-10 LAB
ESTIMATED AVG GLUCOSE: 94 MG/DL (ref 68–131)
HBA1C MFR BLD: 4.9 % (ref 4–5.6)

## 2023-02-10 PROCEDURE — 3078F PR MOST RECENT DIASTOLIC BLOOD PRESSURE < 80 MM HG: ICD-10-PCS | Mod: CPTII,S$GLB,,

## 2023-02-10 PROCEDURE — 83036 HEMOGLOBIN GLYCOSYLATED A1C: CPT

## 2023-02-10 PROCEDURE — 99999 PR PBB SHADOW E&M-EST. PATIENT-LVL III: ICD-10-PCS | Mod: PBBFAC,,,

## 2023-02-10 PROCEDURE — 1160F PR REVIEW ALL MEDS BY PRESCRIBER/CLIN PHARMACIST DOCUMENTED: ICD-10-PCS | Mod: CPTII,S$GLB,,

## 2023-02-10 PROCEDURE — 1160F RVW MEDS BY RX/DR IN RCRD: CPT | Mod: CPTII,S$GLB,,

## 2023-02-10 PROCEDURE — 3008F BODY MASS INDEX DOCD: CPT | Mod: CPTII,S$GLB,,

## 2023-02-10 PROCEDURE — 3008F PR BODY MASS INDEX (BMI) DOCUMENTED: ICD-10-PCS | Mod: CPTII,S$GLB,,

## 2023-02-10 PROCEDURE — 3074F PR MOST RECENT SYSTOLIC BLOOD PRESSURE < 130 MM HG: ICD-10-PCS | Mod: CPTII,S$GLB,,

## 2023-02-10 PROCEDURE — 99214 OFFICE O/P EST MOD 30 MIN: CPT | Mod: S$GLB,,,

## 2023-02-10 PROCEDURE — 1159F PR MEDICATION LIST DOCUMENTED IN MEDICAL RECORD: ICD-10-PCS | Mod: CPTII,S$GLB,,

## 2023-02-10 PROCEDURE — 1159F MED LIST DOCD IN RCRD: CPT | Mod: CPTII,S$GLB,,

## 2023-02-10 PROCEDURE — 80053 COMPREHEN METABOLIC PANEL: CPT

## 2023-02-10 PROCEDURE — 3074F SYST BP LT 130 MM HG: CPT | Mod: CPTII,S$GLB,,

## 2023-02-10 PROCEDURE — 99999 PR PBB SHADOW E&M-EST. PATIENT-LVL III: CPT | Mod: PBBFAC,,,

## 2023-02-10 PROCEDURE — 36415 COLL VENOUS BLD VENIPUNCTURE: CPT

## 2023-02-10 PROCEDURE — 3078F DIAST BP <80 MM HG: CPT | Mod: CPTII,S$GLB,,

## 2023-02-10 PROCEDURE — 99214 PR OFFICE/OUTPT VISIT, EST, LEVL IV, 30-39 MIN: ICD-10-PCS | Mod: S$GLB,,,

## 2023-02-10 RX ORDER — POTASSIUM CHLORIDE 20 MEQ/1
20 TABLET, EXTENDED RELEASE ORAL DAILY
Qty: 7 TABLET | Refills: 0 | Status: SHIPPED | OUTPATIENT
Start: 2023-02-10 | End: 2023-02-13 | Stop reason: DRUGHIGH

## 2023-02-10 RX ORDER — FERROUS SULFATE 325(65) MG
325 TABLET ORAL DAILY
Qty: 90 TABLET | Refills: 0 | Status: SHIPPED | OUTPATIENT
Start: 2023-02-10 | End: 2023-12-14

## 2023-02-10 NOTE — PROGRESS NOTES
Lee Ayala  02/10/2023  [id number]    Shameka Ford MD  Patient Care Team:  Shameka Ford MD as PCP - General (Family Medicine)          Visit Type: Recent ER visit     Chief Complaint:  Chief Complaint   Patient presents with    Chest Pain     Seen at [facility] for chest pains on yesterday. AMA after being there for a while.        History of Present Illness:    History of Present Illness: Lee Ayala is a 42 y.o. female patient who presents to the Emergency Department for mid-sternal CP which onset at 2200 last night. Pt describes her pain as a pressure and states that her pain does not radiate. Symptoms are constant and moderate in severity. No mitigating or exacerbating factors reported. Associated sxs include chest pressure, bilateral leg swelling, and chest tightness.     Patient left the ER on yesterday AMA  Labs from yesterday showed potassium was 3.0 and an anemia     EKG   Normal sinus rhythm   Nonspecific ST and T wave abnormality     Does drink caffeine and eating a lot of fried food   Denies drinking energy drinks or loaded teas  Negative for SOB or pain with breathing  or acid reflux symptoms     Describes pain as mid sternal pressure    While in the ER was given PO potassium     History:  Past Medical History:   Diagnosis Date    Hypertension      No past surgical history on file.  No family history on file.  Social History     Socioeconomic History    Marital status: Single   Tobacco Use    Smoking status: Never    Smokeless tobacco: Never   Substance and Sexual Activity    Alcohol use: No    Drug use: No    Sexual activity: Never     Patient Active Problem List   Diagnosis    Atypical chest pain    Edema of both legs    Class 1 obesity due to excess calories without serious comorbidity with body mass index (BMI) of 33.0 to 33.9 in adult    Pain in both lower extremities     Review of patient's allergies indicates:  No Known Allergies    The following were  reviewed at this visit: active problem list, medication list, allergies, family history, social history, and health maintenance.    Medications:  Current Outpatient Medications on File Prior to Visit   Medication Sig Dispense Refill    hydroCHLOROthiazide (HYDRODIURIL) 25 MG tablet TAKE 1 TABLET(25 MG) BY MOUTH EVERY DAY 90 tablet 1     Current Facility-Administered Medications on File Prior to Visit   Medication Dose Route Frequency Provider Last Rate Last Admin    [COMPLETED] aluminum-magnesium hydroxide-simethicone 200-200-20 mg/5 mL suspension 30 mL  30 mL Oral Once Jennifer JULIO Barreto, DO   30 mL at 02/09/23 1338    And    [COMPLETED] LIDOcaine HCl 2% oral solution 15 mL  15 mL Oral Once Jennifer JULIO Barreto, DO   15 mL at 02/09/23 1338    [COMPLETED] aspirin tablet 325 mg  325 mg Oral ED 1 Time Byron Bolanos Jr., FNP   325 mg at 02/09/23 1132    [COMPLETED] potassium chloride SA CR tablet 40 mEq  40 mEq Oral ED 1 Time Jennifer JULIO Barreto, DO   40 mEq at 02/09/23 1337    [DISCONTINUED] aspirin 325 MG tablet                Medications have been reviewed and reconciled with patient at this visit.  Barriers to medications reviewed with patient.    Adverse reactions to current medications reviewed with patient..    Over the counter medications reviewed and reconciled with patient.    Exam:  Wt Readings from Last 3 Encounters:   02/09/23 76.5 kg (168 lb 8.7 oz)   05/13/22 79.6 kg (175 lb 7.8 oz)   05/12/22 79.6 kg (175 lb 7.8 oz)     Temp Readings from Last 3 Encounters:   02/09/23 97.4 °F (36.3 °C) (Oral)   05/12/22 98.1 °F (36.7 °C) (Temporal)   10/17/19 96.3 °F (35.7 °C) (Tympanic)     BP Readings from Last 3 Encounters:   02/09/23 119/76   05/12/22 126/84   09/01/20 (!) 128/90     Pulse Readings from Last 3 Encounters:   02/09/23 77   05/12/22 90   07/08/20 86     There is no height or weight on file to calculate BMI.      Review of Systems   Respiratory:  Negative for cough, shortness of breath and wheezing.     Cardiovascular:  Positive for chest pain and leg swelling. Negative for palpitations.   Physical Exam  Vitals and nursing note reviewed.   Constitutional:       General: She is not in acute distress.     Appearance: She is well-developed. She is obese. She is not diaphoretic.   HENT:      Head: Normocephalic and atraumatic.      Right Ear: External ear normal.      Left Ear: External ear normal.      Nose: Nose normal.   Eyes:      General:         Right eye: No discharge.         Left eye: No discharge.      Conjunctiva/sclera: Conjunctivae normal.      Pupils: Pupils are equal, round, and reactive to light.   Cardiovascular:      Rate and Rhythm: Normal rate and regular rhythm.      Heart sounds: Normal heart sounds. No murmur heard.  Pulmonary:      Effort: Pulmonary effort is normal. No respiratory distress.      Breath sounds: Normal breath sounds. No wheezing.   Abdominal:      General: Bowel sounds are normal.   Neurological:      Mental Status: She is alert and oriented to person, place, and time.   Psychiatric:         Behavior: Behavior normal.         Thought Content: Thought content normal.         Judgment: Judgment normal.       Laboratory Reviewed ({Yes)  Lab Results   Component Value Date    WBC 3.90 02/09/2023    HGB 10.3 (L) 02/09/2023    HCT 30.5 (L) 02/09/2023     02/09/2023    CHOL 166 05/12/2022    TRIG 134 05/12/2022    HDL 47 05/12/2022    ALT 15 02/09/2023    AST 14 02/09/2023     02/09/2023    K 3.0 (L) 02/09/2023    CL 97 02/09/2023    CREATININE 0.8 02/09/2023    BUN 9 02/09/2023    CO2 30 (H) 02/09/2023    TSH 1.117 02/09/2023     Lee was seen today for chest pain.    Diagnoses and all orders for this visit:    Chest pain, unspecified type  -     Ambulatory referral/consult to Cardiology; Future    Hypokalemia  -     COMPREHENSIVE METABOLIC PANEL; Future    Anemia, unspecified type  -     ferrous sulfate (IRON, FERROUS SULFATE,) 325 mg (65 mg iron) Tab tablet; Take 1  tablet (325 mg total) by mouth once daily.    Obesity (BMI 30-39.9)  -     HEMOGLOBIN A1C; Future    Abnormal finding of blood chemistry, unspecified  -     HEMOGLOBIN A1C; Future    Edema of both lower legs    Other orders  -     potassium chloride SA (K-DUR,KLOR-CON) 20 MEQ tablet; Take 1 tablet (20 mEq total) by mouth once daily.    Increase potassium rich food in diet  Hold HCTZ for now  Pt has not taken the medication in a while  Wear compression socks  Elevate legs  DASH diet     Refer to cards    Care Plan/Goals: Reviewed    Goals    None         Follow up: No follow-ups on file.    After visit summary was printed and given to patient upon discharge today.  Patient goals and care plan are included in After Visit Summary.

## 2023-02-11 LAB
ALBUMIN SERPL BCP-MCNC: 3.5 G/DL (ref 3.5–5.2)
ALP SERPL-CCNC: 52 U/L (ref 55–135)
ALT SERPL W/O P-5'-P-CCNC: 14 U/L (ref 10–44)
ANION GAP SERPL CALC-SCNC: 13 MMOL/L (ref 8–16)
AST SERPL-CCNC: 15 U/L (ref 10–40)
BILIRUB SERPL-MCNC: 0.5 MG/DL (ref 0.1–1)
BUN SERPL-MCNC: 10 MG/DL (ref 6–20)
CALCIUM SERPL-MCNC: 9.5 MG/DL (ref 8.7–10.5)
CHLORIDE SERPL-SCNC: 98 MMOL/L (ref 95–110)
CO2 SERPL-SCNC: 28 MMOL/L (ref 23–29)
CREAT SERPL-MCNC: 0.7 MG/DL (ref 0.5–1.4)
EST. GFR  (NO RACE VARIABLE): >60 ML/MIN/1.73 M^2
GLUCOSE SERPL-MCNC: 94 MG/DL (ref 70–110)
POTASSIUM SERPL-SCNC: 3.1 MMOL/L (ref 3.5–5.1)
PROT SERPL-MCNC: 7 G/DL (ref 6–8.4)
SODIUM SERPL-SCNC: 139 MMOL/L (ref 136–145)

## 2023-02-12 NOTE — PROGRESS NOTES
Subjective:   Patient ID:  Lee Ayala is a 42 y.o. female who presents for follow-up of No chief complaint on file.     History of Present Illness: Lee Ayala is a 42 y.o. female patient who presents to the Emergency Department for mid-sternal CP which onset at 2200 last night. Pt describes her pain as a pressure and states that her pain does not radiate. Symptoms are constant and moderate in severity. No mitigating or exacerbating factors reported. Associated sxs include chest pressure, bilateral leg swelling, and chest tightness.      Patient left the ER on yesterday AMA  Labs from yesterday showed potassium was 3.0 and an anemia      EKG   Normal sinus rhythm   Nonspecific ST and T wave abnormality      Does drink caffeine and eating a lot of fried food   Denies drinking energy drinks or loaded teas  Negative for SOB or pain with breathing  or acid reflux symptoms      Describes pain as mid sternal pressure     While in the ER was given PO potassium      History:       Past Medical History:   Diagnosis Date    Hypertension        No past surgical history on file.  No family history on file.  Social History              Socioeconomic History    Marital status: Single   Tobacco Use    Smoking status: Never    Smokeless tobacco: Never   Substance and Sexual Activity    Alcohol use: No    Drug use: No    Sexual activity: Never             Patient Active Problem List   Diagnosis    Atypical chest pain    Edema of both legs    Class 1 obesity due to excess calories without serious comorbidity with body mass index (BMI) of 33.0 to 33.9 in adult    Pain in both lower extremities       To 1323 this pleasant patient with atypical chest pain with some some typical symptoms.  Nonsmoker no history of drug or alcohol abuse.  No syncope or near syncopal episodes.  Chronic peripheral edema most likely venous insufficiency and lymphedema.  Will plan on a stress test.  Otherwise stable cardiac  echo be done again as well 2020 the echo showed normal heart function.      Review of Systems   Constitutional: Negative for chills, diaphoresis, night sweats, weight gain and weight loss.   HENT:  Negative for congestion, hoarse voice, sore throat and stridor.    Eyes:  Negative for double vision and pain.   Cardiovascular:  Negative for chest pain, claudication, cyanosis, dyspnea on exertion, irregular heartbeat, leg swelling, near-syncope, orthopnea, palpitations, paroxysmal nocturnal dyspnea and syncope.   Respiratory:  Negative for cough, hemoptysis, shortness of breath, sleep disturbances due to breathing, snoring, sputum production and wheezing.    Endocrine: Negative for cold intolerance, heat intolerance and polydipsia.   Hematologic/Lymphatic: Negative for bleeding problem. Does not bruise/bleed easily.   Skin:  Negative for color change, dry skin and rash.   Musculoskeletal:  Negative for joint swelling and muscle cramps.   Gastrointestinal:  Negative for bloating, abdominal pain, constipation, diarrhea, dysphagia, melena, nausea and vomiting.   Genitourinary:  Negative for flank pain and urgency.   Neurological:  Negative for dizziness, focal weakness, headaches, light-headedness, loss of balance, seizures and weakness.   Psychiatric/Behavioral:  Negative for altered mental status and memory loss. The patient is not nervous/anxious.    No family history on file.  Past Medical History:   Diagnosis Date    Hypertension      Social History     Socioeconomic History    Marital status: Single   Tobacco Use    Smoking status: Never     Passive exposure: Never    Smokeless tobacco: Never   Substance and Sexual Activity    Alcohol use: No    Drug use: No    Sexual activity: Never     Current Outpatient Medications on File Prior to Visit   Medication Sig Dispense Refill    ferrous sulfate (IRON, FERROUS SULFATE,) 325 mg (65 mg iron) Tab tablet Take 1 tablet (325 mg total) by mouth once daily. 90 tablet 0     hydroCHLOROthiazide (HYDRODIURIL) 25 MG tablet TAKE 1 TABLET(25 MG) BY MOUTH EVERY DAY 90 tablet 1    potassium chloride SA (K-DUR,KLOR-CON) 20 MEQ tablet Take 1 tablet (20 mEq total) by mouth once daily. 7 tablet 0     No current facility-administered medications on file prior to visit.     Review of patient's allergies indicates:  No Known Allergies    Objective:     Physical Exam  Eyes:      Pupils: Pupils are equal, round, and reactive to light.   Neck:      Trachea: No tracheal deviation.   Cardiovascular:      Rate and Rhythm: Normal rate and regular rhythm.      Pulses: Intact distal pulses.           Carotid pulses are 2+ on the right side and 2+ on the left side.       Radial pulses are 2+ on the right side and 2+ on the left side.        Femoral pulses are 2+ on the right side and 2+ on the left side.       Popliteal pulses are 2+ on the right side and 2+ on the left side.        Dorsalis pedis pulses are 2+ on the right side and 2+ on the left side.        Posterior tibial pulses are 2+ on the right side and 2+ on the left side.      Heart sounds: Normal heart sounds. No murmur heard.    No friction rub. No gallop.   Pulmonary:      Effort: Pulmonary effort is normal. No respiratory distress.      Breath sounds: Normal breath sounds. No stridor. No wheezing or rales.   Chest:      Chest wall: No tenderness.   Abdominal:      General: There is no distension.      Tenderness: There is no abdominal tenderness. There is no rebound.   Musculoskeletal:         General: No tenderness.      Cervical back: Normal range of motion.   Skin:     General: Skin is warm and dry.   Neurological:      Mental Status: She is alert and oriented to person, place, and time.     EKG shows normal sinus rhythm within normal limits.  Assessment:     1. Atypical chest pain    2. Dyspnea on exertion peripheral edema    Plan:     Atypical chest pain    Impression 1 atypical chest pain no acute EKG changes will plan on stress test  2  dyspnea exertional plan on cardiac echo

## 2023-02-13 ENCOUNTER — OFFICE VISIT (OUTPATIENT)
Dept: CARDIOLOGY | Facility: CLINIC | Age: 43
End: 2023-02-13
Payer: MEDICARE

## 2023-02-13 ENCOUNTER — HOSPITAL ENCOUNTER (OUTPATIENT)
Dept: CARDIOLOGY | Facility: HOSPITAL | Age: 43
Discharge: HOME | End: 2023-02-13
Attending: INTERNAL MEDICINE
Payer: MEDICARE

## 2023-02-13 VITALS
BODY MASS INDEX: 31 KG/M2 | HEART RATE: 85 BPM | BODY MASS INDEX: 30.81 KG/M2 | OXYGEN SATURATION: 100 % | WEIGHT: 168.44 LBS | DIASTOLIC BLOOD PRESSURE: 60 MMHG | WEIGHT: 168.44 LBS | SYSTOLIC BLOOD PRESSURE: 114 MMHG | HEIGHT: 62 IN

## 2023-02-13 DIAGNOSIS — R07.89 ATYPICAL CHEST PAIN: ICD-10-CM

## 2023-02-13 DIAGNOSIS — R07.9 CHEST PAIN, UNSPECIFIED TYPE: ICD-10-CM

## 2023-02-13 DIAGNOSIS — E87.6 HYPOKALEMIA: ICD-10-CM

## 2023-02-13 DIAGNOSIS — R06.09 DYSPNEA ON EXERTION: Primary | ICD-10-CM

## 2023-02-13 PROCEDURE — 3078F PR MOST RECENT DIASTOLIC BLOOD PRESSURE < 80 MM HG: ICD-10-PCS | Mod: CPTII,S$GLB,, | Performed by: INTERNAL MEDICINE

## 2023-02-13 PROCEDURE — 1159F MED LIST DOCD IN RCRD: CPT | Mod: CPTII,S$GLB,, | Performed by: INTERNAL MEDICINE

## 2023-02-13 PROCEDURE — 93010 ELECTROCARDIOGRAM REPORT: CPT | Mod: ,,, | Performed by: INTERNAL MEDICINE

## 2023-02-13 PROCEDURE — 99214 OFFICE O/P EST MOD 30 MIN: CPT | Mod: 25,S$GLB,, | Performed by: INTERNAL MEDICINE

## 2023-02-13 PROCEDURE — 93005 ELECTROCARDIOGRAM TRACING: CPT

## 2023-02-13 PROCEDURE — 1159F PR MEDICATION LIST DOCUMENTED IN MEDICAL RECORD: ICD-10-PCS | Mod: CPTII,S$GLB,, | Performed by: INTERNAL MEDICINE

## 2023-02-13 PROCEDURE — 93010 EKG 12-LEAD: ICD-10-PCS | Mod: ,,, | Performed by: INTERNAL MEDICINE

## 2023-02-13 PROCEDURE — 3074F PR MOST RECENT SYSTOLIC BLOOD PRESSURE < 130 MM HG: ICD-10-PCS | Mod: CPTII,S$GLB,, | Performed by: INTERNAL MEDICINE

## 2023-02-13 PROCEDURE — 1160F PR REVIEW ALL MEDS BY PRESCRIBER/CLIN PHARMACIST DOCUMENTED: ICD-10-PCS | Mod: CPTII,S$GLB,, | Performed by: INTERNAL MEDICINE

## 2023-02-13 PROCEDURE — 1160F RVW MEDS BY RX/DR IN RCRD: CPT | Mod: CPTII,S$GLB,, | Performed by: INTERNAL MEDICINE

## 2023-02-13 PROCEDURE — 3008F PR BODY MASS INDEX (BMI) DOCUMENTED: ICD-10-PCS | Mod: CPTII,S$GLB,, | Performed by: INTERNAL MEDICINE

## 2023-02-13 PROCEDURE — 3044F PR MOST RECENT HEMOGLOBIN A1C LEVEL <7.0%: ICD-10-PCS | Mod: CPTII,S$GLB,, | Performed by: INTERNAL MEDICINE

## 2023-02-13 PROCEDURE — 99214 PR OFFICE/OUTPT VISIT, EST, LEVL IV, 30-39 MIN: ICD-10-PCS | Mod: 25,S$GLB,, | Performed by: INTERNAL MEDICINE

## 2023-02-13 PROCEDURE — 3074F SYST BP LT 130 MM HG: CPT | Mod: CPTII,S$GLB,, | Performed by: INTERNAL MEDICINE

## 2023-02-13 PROCEDURE — 3008F BODY MASS INDEX DOCD: CPT | Mod: CPTII,S$GLB,, | Performed by: INTERNAL MEDICINE

## 2023-02-13 PROCEDURE — 99999 PR PBB SHADOW E&M-EST. PATIENT-LVL III: CPT | Mod: PBBFAC,,, | Performed by: INTERNAL MEDICINE

## 2023-02-13 PROCEDURE — 3078F DIAST BP <80 MM HG: CPT | Mod: CPTII,S$GLB,, | Performed by: INTERNAL MEDICINE

## 2023-02-13 PROCEDURE — 3044F HG A1C LEVEL LT 7.0%: CPT | Mod: CPTII,S$GLB,, | Performed by: INTERNAL MEDICINE

## 2023-02-13 PROCEDURE — 99999 PR PBB SHADOW E&M-EST. PATIENT-LVL III: ICD-10-PCS | Mod: PBBFAC,,, | Performed by: INTERNAL MEDICINE

## 2023-02-13 RX ORDER — POTASSIUM CHLORIDE 20 MEQ/1
20 TABLET, EXTENDED RELEASE ORAL DAILY
Qty: 90 TABLET | Refills: 0 | Status: SHIPPED | OUTPATIENT
Start: 2023-02-13 | End: 2023-12-14

## 2023-02-17 ENCOUNTER — TELEPHONE (OUTPATIENT)
Dept: INTERNAL MEDICINE | Facility: CLINIC | Age: 43
End: 2023-02-17
Payer: MEDICAID

## 2023-02-17 NOTE — TELEPHONE ENCOUNTER
Patient wanting to know if she needed to wear her compression socks 24/7. Advised her to put them on in the morning and take them off in the evenings. Only to keep them on when she is up and moving around. No need to wear them when she is ready to sit back and relax.

## 2023-02-17 NOTE — TELEPHONE ENCOUNTER
----- Message from Loly Ayala sent at 2/17/2023 12:44 PM CST -----  Contact: [phone]  Patient is requesting a call  back regarding compression socks. Please call her back at [phone]. Thanks

## 2023-02-23 ENCOUNTER — HOSPITAL ENCOUNTER (OUTPATIENT)
Dept: CARDIOLOGY | Facility: HOSPITAL | Age: 43
Discharge: HOME | End: 2023-02-23
Attending: INTERNAL MEDICINE
Payer: MEDICARE

## 2023-02-23 VITALS
WEIGHT: 168 LBS | BODY MASS INDEX: 30.91 KG/M2 | SYSTOLIC BLOOD PRESSURE: 90 MMHG | DIASTOLIC BLOOD PRESSURE: 57 MMHG | HEIGHT: 62 IN | HEART RATE: 70 BPM

## 2023-02-23 VITALS
HEIGHT: 62 IN | WEIGHT: 168 LBS | BODY MASS INDEX: 30.91 KG/M2 | DIASTOLIC BLOOD PRESSURE: 60 MMHG | SYSTOLIC BLOOD PRESSURE: 114 MMHG

## 2023-02-23 DIAGNOSIS — R07.89 ATYPICAL CHEST PAIN: ICD-10-CM

## 2023-02-23 DIAGNOSIS — E87.6 HYPOKALEMIA: ICD-10-CM

## 2023-02-23 DIAGNOSIS — R06.09 DYSPNEA ON EXERTION: ICD-10-CM

## 2023-02-23 DIAGNOSIS — R07.9 CHEST PAIN, UNSPECIFIED TYPE: ICD-10-CM

## 2023-02-23 LAB
AORTIC ROOT ANNULUS: 2.19 CM
ASCENDING AORTA: 2.4 CM
AV INDEX (PROSTH): 0.67
AV MEAN GRADIENT: 4 MMHG
AV PEAK GRADIENT: 7 MMHG
AV VALVE AREA: 1.95 CM2
AV VELOCITY RATIO: 0.65
BSA FOR ECHO PROCEDURE: 1.83 M2
CV ECHO LV RWT: 0.38 CM
CV STRESS BASE HR: 73 BPM
DIASTOLIC BLOOD PRESSURE: 57 MMHG
DOP CALC AO PEAK VEL: 1.33 M/S
DOP CALC AO VTI: 30.9 CM
DOP CALC LVOT AREA: 2.9 CM2
DOP CALC LVOT DIAMETER: 1.93 CM
DOP CALC LVOT PEAK VEL: 0.87 M/S
DOP CALC LVOT STROKE VOLUME: 60.24 CM3
DOP CALC RVOT PEAK VEL: 0.85 M/S
DOP CALC RVOT VTI: 20.8 CM
DOP CALCLVOT PEAK VEL VTI: 20.6 CM
E WAVE DECELERATION TIME: 173.95 MSEC
E/A RATIO: 1.35
E/E' RATIO: 6.48 M/S
ECHO LV POSTERIOR WALL: 0.92 CM (ref 0.6–1.1)
EJECTION FRACTION: 55 %
FRACTIONAL SHORTENING: 30 % (ref 28–44)
INTERVENTRICULAR SEPTUM: 0.77 CM (ref 0.6–1.1)
IVC DIAMETER: 15 CM
IVRT: 85.63 MSEC
LA MAJOR: 4.76 CM
LA MINOR: 5.01 CM
LEFT ATRIUM SIZE: 3.67 CM
LEFT ATRIUM VOLUME INDEX MOD: 13.5 ML/M2
LEFT ATRIUM VOLUME MOD: 23.85 CM3
LEFT INTERNAL DIMENSION IN SYSTOLE: 3.42 CM (ref 2.1–4)
LEFT VENTRICLE DIASTOLIC VOLUME INDEX: 63.23 ML/M2
LEFT VENTRICLE DIASTOLIC VOLUME: 111.91 ML
LEFT VENTRICLE MASS INDEX: 79 G/M2
LEFT VENTRICLE SYSTOLIC VOLUME INDEX: 27.3 ML/M2
LEFT VENTRICLE SYSTOLIC VOLUME: 48.26 ML
LEFT VENTRICULAR INTERNAL DIMENSION IN DIASTOLE: 4.88 CM (ref 3.5–6)
LEFT VENTRICULAR MASS: 139.86 G
LV LATERAL E/E' RATIO: 5.4 M/S
LV SEPTAL E/E' RATIO: 8.1 M/S
LVOT MG: 1.75 MMHG
LVOT MV: 0.63 CM/S
MV PEAK A VEL: 0.6 M/S
MV PEAK E VEL: 0.81 M/S
MV STENOSIS PRESSURE HALF TIME: 50.45 MS
MV VALVE AREA P 1/2 METHOD: 4.36 CM2
OHS CV CPX 1 MINUTE RECOVERY HEART RATE: 104 BPM
OHS CV CPX 85 PERCENT MAX PREDICTED HEART RATE MALE: 142
OHS CV CPX ESTIMATED METS: 7
OHS CV CPX MAX PREDICTED HEART RATE: 167
OHS CV CPX PATIENT IS FEMALE: 1
OHS CV CPX PATIENT IS MALE: 0
OHS CV CPX PEAK DIASTOLIC BLOOD PRESSURE: 55 MMHG
OHS CV CPX PEAK HEAR RATE: 134 BPM
OHS CV CPX PEAK RATE PRESSURE PRODUCT: NORMAL
OHS CV CPX PEAK SYSTOLIC BLOOD PRESSURE: 131 MMHG
OHS CV CPX PERCENT MAX PREDICTED HEART RATE ACHIEVED: 80
OHS CV CPX RATE PRESSURE PRODUCT PRESENTING: 6570
PISA TR MAX VEL: 2.51 M/S
PULM VEIN S/D RATIO: 1.68
PV MEAN GRADIENT: 1.6 MMHG
PV PEAK D VEL: 0.38 M/S
PV PEAK S VEL: 0.64 M/S
PV PEAK VELOCITY: 1.09 CM/S
RA MAJOR: 4.58 CM
RA PRESSURE: 8 MMHG
RA WIDTH: 4.23 CM
RIGHT VENTRICULAR END-DIASTOLIC DIMENSION: 4.15 CM
SINUS: 2.24 CM
STJ: 2.28 CM
STRESS ECHO POST EXERCISE DUR MIN: 4 MINUTES
STRESS ECHO POST EXERCISE DUR SEC: 3 SECONDS
SYSTOLIC BLOOD PRESSURE: 90 MMHG
TDI LATERAL: 0.15 M/S
TDI SEPTAL: 0.1 M/S
TDI: 0.13 M/S
TR MAX PG: 25 MMHG
TRICUSPID ANNULAR PLANE SYSTOLIC EXCURSION: 1.73 CM
TV REST PULMONARY ARTERY PRESSURE: 33 MMHG

## 2023-02-23 PROCEDURE — 93306 ECHO (CUPID ONLY): ICD-10-PCS | Mod: 26,,, | Performed by: INTERNAL MEDICINE

## 2023-02-23 PROCEDURE — 93016 CV STRESS TEST SUPVJ ONLY: CPT | Mod: ,,, | Performed by: INTERNAL MEDICINE

## 2023-02-23 PROCEDURE — 93306 TTE W/DOPPLER COMPLETE: CPT | Mod: 26,,, | Performed by: INTERNAL MEDICINE

## 2023-02-23 PROCEDURE — 93016 EXERCISE STRESS - EKG (CUPID ONLY): ICD-10-PCS | Mod: ,,, | Performed by: INTERNAL MEDICINE

## 2023-02-23 PROCEDURE — 93018 EXERCISE STRESS - EKG (CUPID ONLY): ICD-10-PCS | Mod: ,,, | Performed by: INTERNAL MEDICINE

## 2023-02-23 PROCEDURE — 93306 TTE W/DOPPLER COMPLETE: CPT

## 2023-02-23 PROCEDURE — 93018 CV STRESS TEST I&R ONLY: CPT | Mod: ,,, | Performed by: INTERNAL MEDICINE

## 2023-02-23 PROCEDURE — 93017 CV STRESS TEST TRACING ONLY: CPT

## 2023-03-03 ENCOUNTER — TELEPHONE (OUTPATIENT)
Dept: CARDIOLOGY | Facility: CLINIC | Age: 43
End: 2023-03-03
Payer: MEDICARE

## 2023-03-03 NOTE — TELEPHONE ENCOUNTER
Called patient to give her result from stress test. Patient understood results with no questions or concerns.       ----- Message from Morteza Valadez MD sent at 3/3/2023  3:13 PM CST -----  Normal stress test no evidence of blockage  ----- Message -----  From: Juancho Soto  Sent: 3/3/2023   1:58 PM CST  To: Morteza Valadez MD

## 2023-04-29 NOTE — PROGRESS NOTES
GLADIS uploaded in media and faxed to [facility] for Pap Smear [phone]     Pt arrived to ED w/ C/O rash that started on face and inside mouth that she stated has now spread \" all over\" after face waxing. She endorses picking at them making them worse and that they are painful. PMH of anxiety and depression.

## 2023-07-23 NOTE — TELEPHONE ENCOUNTER
----- Message from Ainsley Wu sent at 7/15/2020 10:52 AM CDT -----  Regarding: rescedule  Pt has echocardiogram as well as cardiovascular procedures on 07/24/2020 that she is requesting to reschedule. Please call back [phone]  Thanks    
Could not find any sooner appointments - pt will kepp Friday 7/24 for now   
teste pain

## 2023-08-17 ENCOUNTER — TELEPHONE (OUTPATIENT)
Dept: INTERNAL MEDICINE | Facility: CLINIC | Age: 43
End: 2023-08-17
Payer: MEDICAID

## 2023-08-17 NOTE — TELEPHONE ENCOUNTER
Returned pt call because she states that she took a home test and the test came up positive for Covid. Pt requested an appointment to be seen today. Advised pt that she can for to the nearest urgent care for eval/treatment. Pt verbalized understanding.

## 2023-08-17 NOTE — TELEPHONE ENCOUNTER
----- Message from Akila Hugo sent at 8/17/2023  2:15 PM CDT -----  Contact: Pt  Type:  Sooner Appointment Request    Caller is requesting a sooner appointment.  Caller declined first available appointment listed below.  Caller will not accept being placed on the waitlist and is requesting a message be sent to doctor.    Name of Caller:  Pt  When is the first available appointment?  No Availability medicaid   Symptoms:  Covid Positive   Best Call Back Number:  [phone]    Additional Information:  Pt was calling to speak with office in regards to getting an appt set up pt  stated they tested positive today pt stated to please call when available Thank you

## 2023-08-22 ENCOUNTER — TELEPHONE (OUTPATIENT)
Dept: INTERNAL MEDICINE | Facility: CLINIC | Age: 43
End: 2023-08-22
Payer: MEDICAID

## 2023-08-22 NOTE — TELEPHONE ENCOUNTER
----- Message from Alison Webster sent at 8/22/2023  7:45 AM CDT -----  Contact: Lee Howard is needing a call back regarding her having tested positive for COVID on 8/17 at an urgent care and needing to know what to do as she tested positive still last night. Please call her at [phone].

## 2023-08-22 NOTE — TELEPHONE ENCOUNTER
Called and spoke with patient. Advised per CDC guidelines they do not recommend retesting because it can show positive. She said she is feeling better. Advised to wear a mask for the next five days around others.

## 2023-09-05 ENCOUNTER — TELEPHONE (OUTPATIENT)
Dept: INTERNAL MEDICINE | Facility: CLINIC | Age: 43
End: 2023-09-05
Payer: MEDICAID

## 2023-09-05 NOTE — TELEPHONE ENCOUNTER
----- Message from Rand Todd sent at 9/5/2023  3:06 PM CDT -----  States she would like to speak to the nurse, to see, if she needs to come or anything. Please call pt [phone]. Thank you

## 2023-10-12 ENCOUNTER — OFFICE VISIT (OUTPATIENT)
Dept: INTERNAL MEDICINE | Facility: CLINIC | Age: 43
End: 2023-10-12
Payer: MEDICARE

## 2023-10-12 ENCOUNTER — LAB VISIT (OUTPATIENT)
Dept: LAB | Facility: HOSPITAL | Age: 43
End: 2023-10-12
Payer: MEDICAID

## 2023-10-12 VITALS
HEIGHT: 62 IN | OXYGEN SATURATION: 96 % | WEIGHT: 168.19 LBS | HEART RATE: 69 BPM | TEMPERATURE: 99 F | DIASTOLIC BLOOD PRESSURE: 66 MMHG | SYSTOLIC BLOOD PRESSURE: 124 MMHG | BODY MASS INDEX: 30.95 KG/M2

## 2023-10-12 DIAGNOSIS — D64.9 ANEMIA, UNSPECIFIED TYPE: ICD-10-CM

## 2023-10-12 DIAGNOSIS — Z00.00 ANNUAL PHYSICAL EXAM: Primary | ICD-10-CM

## 2023-10-12 DIAGNOSIS — Z12.31 ENCOUNTER FOR SCREENING MAMMOGRAM FOR BREAST CANCER: ICD-10-CM

## 2023-10-12 DIAGNOSIS — E66.9 OBESITY (BMI 30-39.9): ICD-10-CM

## 2023-10-12 DIAGNOSIS — R60.0 EDEMA OF BOTH LEGS: ICD-10-CM

## 2023-10-12 DIAGNOSIS — E87.6 HYPOKALEMIA: ICD-10-CM

## 2023-10-12 LAB
ALBUMIN SERPL BCP-MCNC: 3.6 G/DL (ref 3.5–5.2)
ALP SERPL-CCNC: 52 U/L (ref 55–135)
ALT SERPL W/O P-5'-P-CCNC: 14 U/L (ref 10–44)
ANION GAP SERPL CALC-SCNC: 9 MMOL/L (ref 8–16)
AST SERPL-CCNC: 14 U/L (ref 10–40)
BASOPHILS # BLD AUTO: 0.02 K/UL (ref 0–0.2)
BASOPHILS NFR BLD: 0.6 % (ref 0–1.9)
BILIRUB SERPL-MCNC: 0.9 MG/DL (ref 0.1–1)
BUN SERPL-MCNC: 7 MG/DL (ref 6–20)
CALCIUM SERPL-MCNC: 9.3 MG/DL (ref 8.7–10.5)
CHLORIDE SERPL-SCNC: 103 MMOL/L (ref 95–110)
CHOLEST SERPL-MCNC: 136 MG/DL (ref 120–199)
CHOLEST/HDLC SERPL: 3.5 {RATIO} (ref 2–5)
CO2 SERPL-SCNC: 26 MMOL/L (ref 23–29)
CREAT SERPL-MCNC: 0.8 MG/DL (ref 0.5–1.4)
DIFFERENTIAL METHOD: ABNORMAL
EOSINOPHIL # BLD AUTO: 0.1 K/UL (ref 0–0.5)
EOSINOPHIL NFR BLD: 1.7 % (ref 0–8)
ERYTHROCYTE [DISTWIDTH] IN BLOOD BY AUTOMATED COUNT: 11.8 % (ref 11.5–14.5)
EST. GFR  (NO RACE VARIABLE): >60 ML/MIN/1.73 M^2
GLUCOSE SERPL-MCNC: 81 MG/DL (ref 70–110)
HCT VFR BLD AUTO: 33.6 % (ref 37–48.5)
HDLC SERPL-MCNC: 39 MG/DL (ref 40–75)
HDLC SERPL: 28.7 % (ref 20–50)
HGB BLD-MCNC: 11.3 G/DL (ref 12–16)
IMM GRANULOCYTES # BLD AUTO: 0.02 K/UL (ref 0–0.04)
IMM GRANULOCYTES NFR BLD AUTO: 0.6 % (ref 0–0.5)
IRON SERPL-MCNC: 137 UG/DL (ref 30–160)
LDLC SERPL CALC-MCNC: 76.8 MG/DL (ref 63–159)
LYMPHOCYTES # BLD AUTO: 1.3 K/UL (ref 1–4.8)
LYMPHOCYTES NFR BLD: 37 % (ref 18–48)
MCH RBC QN AUTO: 33.7 PG (ref 27–31)
MCHC RBC AUTO-ENTMCNC: 33.6 G/DL (ref 32–36)
MCV RBC AUTO: 100 FL (ref 82–98)
MONOCYTES # BLD AUTO: 0.3 K/UL (ref 0.3–1)
MONOCYTES NFR BLD: 8.7 % (ref 4–15)
NEUTROPHILS # BLD AUTO: 1.8 K/UL (ref 1.8–7.7)
NEUTROPHILS NFR BLD: 51.4 % (ref 38–73)
NONHDLC SERPL-MCNC: 97 MG/DL
NRBC BLD-RTO: 0 /100 WBC
PLATELET # BLD AUTO: 279 K/UL (ref 150–450)
PMV BLD AUTO: 9.9 FL (ref 9.2–12.9)
POTASSIUM SERPL-SCNC: 3.7 MMOL/L (ref 3.5–5.1)
PROT SERPL-MCNC: 6.9 G/DL (ref 6–8.4)
RBC # BLD AUTO: 3.35 M/UL (ref 4–5.4)
SATURATED IRON: 40 % (ref 20–50)
SODIUM SERPL-SCNC: 138 MMOL/L (ref 136–145)
TOTAL IRON BINDING CAPACITY: 339 UG/DL (ref 250–450)
TRANSFERRIN SERPL-MCNC: 229 MG/DL (ref 200–375)
TRIGL SERPL-MCNC: 101 MG/DL (ref 30–150)
WBC # BLD AUTO: 3.57 K/UL (ref 3.9–12.7)

## 2023-10-12 PROCEDURE — 3074F SYST BP LT 130 MM HG: CPT | Mod: CPTII,S$GLB,,

## 2023-10-12 PROCEDURE — 80053 COMPREHEN METABOLIC PANEL: CPT

## 2023-10-12 PROCEDURE — 3008F BODY MASS INDEX DOCD: CPT | Mod: CPTII,S$GLB,,

## 2023-10-12 PROCEDURE — 99214 OFFICE O/P EST MOD 30 MIN: CPT | Mod: S$GLB,,,

## 2023-10-12 PROCEDURE — 3044F PR MOST RECENT HEMOGLOBIN A1C LEVEL <7.0%: ICD-10-PCS | Mod: CPTII,S$GLB,,

## 2023-10-12 PROCEDURE — 3074F PR MOST RECENT SYSTOLIC BLOOD PRESSURE < 130 MM HG: ICD-10-PCS | Mod: CPTII,S$GLB,,

## 2023-10-12 PROCEDURE — 3044F HG A1C LEVEL LT 7.0%: CPT | Mod: CPTII,S$GLB,,

## 2023-10-12 PROCEDURE — 83540 ASSAY OF IRON: CPT

## 2023-10-12 PROCEDURE — 99999 PR PBB SHADOW E&M-EST. PATIENT-LVL III: CPT | Mod: PBBFAC,,,

## 2023-10-12 PROCEDURE — 1159F MED LIST DOCD IN RCRD: CPT | Mod: CPTII,S$GLB,,

## 2023-10-12 PROCEDURE — 99999 PR PBB SHADOW E&M-EST. PATIENT-LVL III: ICD-10-PCS | Mod: PBBFAC,,,

## 2023-10-12 PROCEDURE — 1159F PR MEDICATION LIST DOCUMENTED IN MEDICAL RECORD: ICD-10-PCS | Mod: CPTII,S$GLB,,

## 2023-10-12 PROCEDURE — 3078F PR MOST RECENT DIASTOLIC BLOOD PRESSURE < 80 MM HG: ICD-10-PCS | Mod: CPTII,S$GLB,,

## 2023-10-12 PROCEDURE — 99214 PR OFFICE/OUTPT VISIT, EST, LEVL IV, 30-39 MIN: ICD-10-PCS | Mod: S$GLB,,,

## 2023-10-12 PROCEDURE — 3008F PR BODY MASS INDEX (BMI) DOCUMENTED: ICD-10-PCS | Mod: CPTII,S$GLB,,

## 2023-10-12 PROCEDURE — 84466 ASSAY OF TRANSFERRIN: CPT

## 2023-10-12 PROCEDURE — 3078F DIAST BP <80 MM HG: CPT | Mod: CPTII,S$GLB,,

## 2023-10-12 PROCEDURE — 36415 COLL VENOUS BLD VENIPUNCTURE: CPT

## 2023-10-12 PROCEDURE — 80061 LIPID PANEL: CPT

## 2023-10-12 PROCEDURE — 1160F PR REVIEW ALL MEDS BY PRESCRIBER/CLIN PHARMACIST DOCUMENTED: ICD-10-PCS | Mod: CPTII,S$GLB,,

## 2023-10-12 PROCEDURE — 85025 COMPLETE CBC W/AUTO DIFF WBC: CPT

## 2023-10-12 PROCEDURE — 1160F RVW MEDS BY RX/DR IN RCRD: CPT | Mod: CPTII,S$GLB,,

## 2023-10-12 RX ORDER — HYDROCHLOROTHIAZIDE 25 MG/1
25 TABLET ORAL DAILY
Qty: 90 TABLET | Refills: 1 | Status: SHIPPED | OUTPATIENT
Start: 2023-10-12 | End: 2023-12-13

## 2023-10-12 NOTE — PROGRESS NOTES
Lee Alanisgoran Jamie  10/[birth year]2023  [id number]    Shameka Ford MD  Patient Care Team:  Shameka Ford MD as PCP - General (Family Medicine)          Visit Type:a scheduled routine follow-up visit    Chief Complaint:  Chief Complaint   Patient presents with    Annual Exam        History of Present Illness:    42 year old female presents today for an annual exam     At visit she has stopped taking all meds    Still having bilateral leg edema  Wearing compression socks  Watches diet at times       History:  Past Medical History:   Diagnosis Date    Hypertension      No past surgical history on file.  No family history on file.  Social History     Socioeconomic History    Marital status: Single   Tobacco Use    Smoking status: Never     Passive exposure: Never    Smokeless tobacco: Never   Substance and Sexual Activity    Alcohol use: No    Drug use: No    Sexual activity: Never     Patient Active Problem List   Diagnosis    Atypical chest pain    Edema of both legs    Class 1 obesity due to excess calories without serious comorbidity with body mass index (BMI) of 33.0 to 33.9 in adult    Pain in both lower extremities     Review of patient's allergies indicates:  No Known Allergies    The following were reviewed at this visit: active problem list, medication list, allergies, family history, social history, and health maintenance.    Medications:  Current Outpatient Medications on File Prior to Visit   Medication Sig Dispense Refill    ferrous sulfate (IRON, FERROUS SULFATE,) 325 mg (65 mg iron) Tab tablet Take 1 tablet (325 mg total) by mouth once daily. 90 tablet 0    hydroCHLOROthiazide (HYDRODIURIL) 25 MG tablet TAKE 1 TABLET(25 MG) BY MOUTH EVERY DAY 90 tablet 1    potassium chloride SA (K-DUR,KLOR-CON) 20 MEQ tablet Take 1 tablet (20 mEq total) by mouth once daily. 90 tablet 0     No current facility-administered medications on file prior to visit.       Medications have been reviewed and reconciled  with patient at this visit.  Barriers to medications reviewed with patient.    Adverse reactions to current medications reviewed with patient..    Over the counter medications reviewed and reconciled with patient.    Exam:  Wt Readings from Last 3 Encounters:   02/23/23 76.2 kg (168 lb)   02/23/23 76.2 kg (168 lb)   02/13/23 76.4 kg (168 lb 6.9 oz)     Temp Readings from Last 3 Encounters:   02/10/23 97.2 °F (36.2 °C)   02/09/23 97.4 °F (36.3 °C) (Oral)   05/12/22 98.1 °F (36.7 °C) (Temporal)     BP Readings from Last 3 Encounters:   02/23/23 114/60   02/23/23 (!) 90/57   02/13/23 114/60     Pulse Readings from Last 3 Encounters:   02/23/23 70   02/13/23 85   02/10/23 84     There is no height or weight on file to calculate BMI.      Review of Systems   Cardiovascular:  Positive for leg swelling.     Physical Exam  Vitals and nursing note reviewed.   Constitutional:       General: She is not in acute distress.     Appearance: She is well-developed. She is not diaphoretic.   HENT:      Head: Normocephalic and atraumatic.      Right Ear: External ear normal.      Left Ear: External ear normal.   Eyes:      General:         Right eye: No discharge.         Left eye: No discharge.      Conjunctiva/sclera: Conjunctivae normal.      Pupils: Pupils are equal, round, and reactive to light.   Cardiovascular:      Rate and Rhythm: Normal rate and regular rhythm.      Heart sounds: Normal heart sounds. No murmur heard.  Pulmonary:      Effort: Pulmonary effort is normal. No respiratory distress.      Breath sounds: Normal breath sounds. No wheezing.   Neurological:      Mental Status: She is alert.         Laboratory Reviewed ({Yes)  Lab Results   Component Value Date    WBC 3.90 02/09/2023    HGB 10.3 (L) 02/09/2023    HCT 30.5 (L) 02/09/2023     02/09/2023    CHOL 166 05/12/2022    TRIG 134 05/12/2022    HDL 47 05/12/2022    ALT 14 02/10/2023    AST 15 02/10/2023     02/10/2023    K 3.1 (L) 02/10/2023    CL 98  02/10/2023    CREATININE 0.7 02/10/2023    BUN 10 02/10/2023    CO2 28 02/10/2023    TSH 1.117 02/09/2023    HGBA1C 4.9 02/10/2023     Lee was seen today for annual exam.    Diagnoses and all orders for this visit:    Annual physical exam  -     COMPREHENSIVE METABOLIC PANEL; Future  -     CBC Auto Differential; Future  -     Iron and TIBC; Future  -     Lipid Panel; Future    Encounter for screening mammogram for breast cancer  -     Mammo Digital Screening Bilat w/ Jaciel; Future    Hypokalemia  -     COMPREHENSIVE METABOLIC PANEL; Future  -     Lipid Panel; Future    Obesity (BMI 30-39.9)  -     COMPREHENSIVE METABOLIC PANEL; Future  -     hydroCHLOROthiazide (HYDRODIURIL) 25 MG tablet; Take 1 tablet (25 mg total) by mouth once daily.  -     CBC Auto Differential; Future  -     Iron and TIBC; Future  -     Lipid Panel; Future    Edema of both legs  -     hydroCHLOROthiazide (HYDRODIURIL) 25 MG tablet; Take 1 tablet (25 mg total) by mouth once daily.    Anemia, unspecified type  -     CBC Auto Differential; Future  -     Iron and TIBC; Future    Labs today  Restart HCTZ  Will wait for lab results before restarting iron and potassium   MMG    Will schedule a follow up exam with Dr. Ford in 6 months       Care Plan/Goals: Reviewed    Goals    None         Follow up: No follow-ups on file.    After visit summary was printed and given to patient upon discharge today.  Patient goals and care plan are included in After Visit Summary.

## 2023-10-20 ENCOUNTER — TELEPHONE (OUTPATIENT)
Dept: INTERNAL MEDICINE | Facility: CLINIC | Age: 43
End: 2023-10-20
Payer: MEDICAID

## 2023-10-20 NOTE — TELEPHONE ENCOUNTER
----- Message from Chante Martinez sent at 10/20/2023  4:43 PM CDT -----  Contact: Lee Howard would like a call back at [phone] in regards to needing to speak with nurse about what she is supposed to be eating.  Thanks   Am

## 2023-11-03 ENCOUNTER — HOSPITAL ENCOUNTER (OUTPATIENT)
Dept: RADIOLOGY | Facility: HOSPITAL | Age: 43
Discharge: HOME | End: 2023-11-03
Payer: MEDICARE

## 2023-11-03 DIAGNOSIS — Z12.31 ENCOUNTER FOR SCREENING MAMMOGRAM FOR BREAST CANCER: ICD-10-CM

## 2023-11-03 PROCEDURE — 77067 SCR MAMMO BI INCL CAD: CPT | Mod: 26,,, | Performed by: RADIOLOGY

## 2023-11-03 PROCEDURE — 77067 MAMMO DIGITAL SCREENING BILAT WITH TOMO: ICD-10-PCS | Mod: 26,,, | Performed by: RADIOLOGY

## 2023-11-03 PROCEDURE — 77063 MAMMO DIGITAL SCREENING BILAT WITH TOMO: ICD-10-PCS | Mod: 26,,, | Performed by: RADIOLOGY

## 2023-11-03 PROCEDURE — 77067 SCR MAMMO BI INCL CAD: CPT | Mod: TC

## 2023-11-03 PROCEDURE — 77063 BREAST TOMOSYNTHESIS BI: CPT | Mod: 26,,, | Performed by: RADIOLOGY

## 2023-11-06 ENCOUNTER — TELEPHONE (OUTPATIENT)
Dept: INTERNAL MEDICINE | Facility: CLINIC | Age: 43
End: 2023-11-06
Payer: MEDICARE

## 2023-11-06 NOTE — TELEPHONE ENCOUNTER
----- Message from Pamela Carpio sent at 11/6/2023  8:16 AM CST -----  Contact: Lee  Patient is calling to speak with a nurse . Please give a call back at [phone] .

## 2023-11-06 NOTE — TELEPHONE ENCOUNTER
Called and spoke with patient. Patient states that she has been now having an issue with swallowing the potassium pill. She said she took it with some really cold water and it would not go down. She said she thought it did then ended up vomiting it back up. Now with sore throat and redness to her chest. Any recommendations on helping get the medication down? She called in to work today because she kept spitting up.

## 2023-11-07 NOTE — TELEPHONE ENCOUNTER
Called and spoke with patient regarding recommendations from PCP. Patient verbalized understanding.

## 2023-11-08 ENCOUNTER — TELEPHONE (OUTPATIENT)
Dept: RADIOLOGY | Facility: HOSPITAL | Age: 43
End: 2023-11-08
Payer: MEDICARE

## 2023-11-15 ENCOUNTER — TELEPHONE (OUTPATIENT)
Dept: INTERNAL MEDICINE | Facility: CLINIC | Age: 43
End: 2023-11-15
Payer: MEDICARE

## 2023-11-15 ENCOUNTER — HOSPITAL ENCOUNTER (OUTPATIENT)
Dept: RADIOLOGY | Facility: HOSPITAL | Age: 43
Discharge: HOME | End: 2023-11-15
Payer: MEDICARE

## 2023-11-15 DIAGNOSIS — R92.8 ABNORMAL MAMMOGRAM: ICD-10-CM

## 2023-11-15 PROCEDURE — 77065 MAMMO DIGITAL DIAGNOSTIC RIGHT WITH TOMO: ICD-10-PCS | Mod: 26,RT,, | Performed by: RADIOLOGY

## 2023-11-15 PROCEDURE — 77065 DX MAMMO INCL CAD UNI: CPT | Mod: TC,RT

## 2023-11-15 PROCEDURE — 77065 DX MAMMO INCL CAD UNI: CPT | Mod: 26,RT,, | Performed by: RADIOLOGY

## 2023-11-15 PROCEDURE — 77061 BREAST TOMOSYNTHESIS UNI: CPT | Mod: 26,RT,, | Performed by: RADIOLOGY

## 2023-11-15 PROCEDURE — 77061 MAMMO DIGITAL DIAGNOSTIC RIGHT WITH TOMO: ICD-10-PCS | Mod: 26,RT,, | Performed by: RADIOLOGY

## 2023-11-15 NOTE — TELEPHONE ENCOUNTER
----- Message from Dillon Rucker sent at 11/15/2023  2:23 PM CST -----  Contact: self  Pt is asking for an return call in reference to questions she has concerning mammogram she had on today ,please call back at .[phone] Thx CJ

## 2023-11-29 ENCOUNTER — TELEPHONE (OUTPATIENT)
Dept: INTERNAL MEDICINE | Facility: CLINIC | Age: 43
End: 2023-11-29
Payer: MEDICARE

## 2023-11-29 NOTE — TELEPHONE ENCOUNTER
----- Message from Lori nico sent at 11/29/2023  8:02 AM CST -----  Name of Who is Calling:Patient           What is the request in detail:Patient states she was having chest pains last night and her right breast is still hurting every now and again.           Can the clinic reply by [facility]:no           What [facility]: [phone]

## 2023-11-29 NOTE — TELEPHONE ENCOUNTER
Called and spoke with patient. She has been some chest pain last night that has resolved. She is still having some right breast pain. I told her that she needs to get scheduled for a follow up with [facility] because she was to follow up. I scheduled the first available with [facility] here at [facility] per her request in the morning.I tried to get her scheduled for a follow up with Dr. Ford or alexa she wasn't able to schedule at this time. I told her if she has chest pains she needs to go to the ER for stat care. Patient verbalized understanding.

## 2023-12-12 ENCOUNTER — TELEPHONE (OUTPATIENT)
Dept: INTERNAL MEDICINE | Facility: CLINIC | Age: 43
End: 2023-12-12
Payer: MEDICARE

## 2023-12-12 ENCOUNTER — TELEPHONE (OUTPATIENT)
Dept: CARDIOLOGY | Facility: CLINIC | Age: 43
End: 2023-12-12
Payer: MEDICARE

## 2023-12-12 DIAGNOSIS — R07.89 ATYPICAL CHEST PAIN: Primary | ICD-10-CM

## 2023-12-12 DIAGNOSIS — Z76.89 ESTABLISHING CARE WITH NEW DOCTOR, ENCOUNTER FOR: ICD-10-CM

## 2023-12-12 NOTE — TELEPHONE ENCOUNTER
I returned pts call. She says someone has already reschedule her appt.   States she has been having some intermittent CP that she feels is not bad enough to go to ER for. Non radiating only about 2-3 of 10 on pain scale.  I advised if her cp continues or becomes more frequent/severe to report to ER   She states understanding well.     ----- Message from Brijesh Sorensen sent at 12/12/2023  8:56 AM CST -----  Contact: Lee  Type:  Same Day Appointment Request    Caller is requesting a same day appointment.  Caller declined first available appointment listed below.    Name of Caller:Lee  When is the first available appointment?11/13  Symptoms:severe chest pains  Best Call Back Number:[phone]   Additional Information:

## 2023-12-12 NOTE — TELEPHONE ENCOUNTER
----- Message from Brijesh Sorensen sent at 12/12/2023  8:57 AM CST -----  Contact: Lee Howard is needing a call back in regards to addressing some concerns. Please give her a call back at [phone]

## 2023-12-13 ENCOUNTER — OFFICE VISIT (OUTPATIENT)
Dept: CARDIOLOGY | Facility: CLINIC | Age: 43
End: 2023-12-13
Payer: MEDICARE

## 2023-12-13 ENCOUNTER — HOSPITAL ENCOUNTER (OUTPATIENT)
Dept: CARDIOLOGY | Facility: HOSPITAL | Age: 43
Discharge: HOME | End: 2023-12-13
Attending: STUDENT IN AN ORGANIZED HEALTH CARE EDUCATION/TRAINING PROGRAM
Payer: MEDICARE

## 2023-12-13 VITALS
OXYGEN SATURATION: 99 % | DIASTOLIC BLOOD PRESSURE: 80 MMHG | HEIGHT: 62 IN | HEART RATE: 76 BPM | BODY MASS INDEX: 30.91 KG/M2 | SYSTOLIC BLOOD PRESSURE: 118 MMHG | WEIGHT: 168 LBS

## 2023-12-13 DIAGNOSIS — R60.0 EDEMA OF BOTH LEGS: Primary | ICD-10-CM

## 2023-12-13 DIAGNOSIS — E66.9 OBESITY (BMI 30-39.9): ICD-10-CM

## 2023-12-13 DIAGNOSIS — E66.09 CLASS 1 OBESITY DUE TO EXCESS CALORIES WITHOUT SERIOUS COMORBIDITY WITH BODY MASS INDEX (BMI) OF 30.0 TO 30.9 IN ADULT: ICD-10-CM

## 2023-12-13 DIAGNOSIS — D64.9 ANEMIA, UNSPECIFIED TYPE: ICD-10-CM

## 2023-12-13 DIAGNOSIS — M94.0 COSTOCHONDRITIS: ICD-10-CM

## 2023-12-13 DIAGNOSIS — R07.89 ATYPICAL CHEST PAIN: ICD-10-CM

## 2023-12-13 DIAGNOSIS — E87.6 HYPOKALEMIA: ICD-10-CM

## 2023-12-13 PROCEDURE — 93010 ELECTROCARDIOGRAM REPORT: CPT | Mod: ,,, | Performed by: STUDENT IN AN ORGANIZED HEALTH CARE EDUCATION/TRAINING PROGRAM

## 2023-12-13 PROCEDURE — 3079F DIAST BP 80-89 MM HG: CPT | Mod: CPTII,S$GLB,, | Performed by: STUDENT IN AN ORGANIZED HEALTH CARE EDUCATION/TRAINING PROGRAM

## 2023-12-13 PROCEDURE — 3008F BODY MASS INDEX DOCD: CPT | Mod: CPTII,S$GLB,, | Performed by: STUDENT IN AN ORGANIZED HEALTH CARE EDUCATION/TRAINING PROGRAM

## 2023-12-13 PROCEDURE — 99213 OFFICE O/P EST LOW 20 MIN: CPT | Mod: 25,S$GLB,, | Performed by: STUDENT IN AN ORGANIZED HEALTH CARE EDUCATION/TRAINING PROGRAM

## 2023-12-13 PROCEDURE — 99999 PR PBB SHADOW E&M-EST. PATIENT-LVL III: ICD-10-PCS | Mod: PBBFAC,,, | Performed by: STUDENT IN AN ORGANIZED HEALTH CARE EDUCATION/TRAINING PROGRAM

## 2023-12-13 PROCEDURE — 3044F PR MOST RECENT HEMOGLOBIN A1C LEVEL <7.0%: ICD-10-PCS | Mod: CPTII,S$GLB,, | Performed by: STUDENT IN AN ORGANIZED HEALTH CARE EDUCATION/TRAINING PROGRAM

## 2023-12-13 PROCEDURE — 93010 EKG 12-LEAD: ICD-10-PCS | Mod: ,,, | Performed by: STUDENT IN AN ORGANIZED HEALTH CARE EDUCATION/TRAINING PROGRAM

## 2023-12-13 PROCEDURE — 3044F HG A1C LEVEL LT 7.0%: CPT | Mod: CPTII,S$GLB,, | Performed by: STUDENT IN AN ORGANIZED HEALTH CARE EDUCATION/TRAINING PROGRAM

## 2023-12-13 PROCEDURE — 3079F PR MOST RECENT DIASTOLIC BLOOD PRESSURE 80-89 MM HG: ICD-10-PCS | Mod: CPTII,S$GLB,, | Performed by: STUDENT IN AN ORGANIZED HEALTH CARE EDUCATION/TRAINING PROGRAM

## 2023-12-13 PROCEDURE — 3074F SYST BP LT 130 MM HG: CPT | Mod: CPTII,S$GLB,, | Performed by: STUDENT IN AN ORGANIZED HEALTH CARE EDUCATION/TRAINING PROGRAM

## 2023-12-13 PROCEDURE — 3008F PR BODY MASS INDEX (BMI) DOCUMENTED: ICD-10-PCS | Mod: CPTII,S$GLB,, | Performed by: STUDENT IN AN ORGANIZED HEALTH CARE EDUCATION/TRAINING PROGRAM

## 2023-12-13 PROCEDURE — 99999 PR PBB SHADOW E&M-EST. PATIENT-LVL III: CPT | Mod: PBBFAC,,, | Performed by: STUDENT IN AN ORGANIZED HEALTH CARE EDUCATION/TRAINING PROGRAM

## 2023-12-13 PROCEDURE — 3074F PR MOST RECENT SYSTOLIC BLOOD PRESSURE < 130 MM HG: ICD-10-PCS | Mod: CPTII,S$GLB,, | Performed by: STUDENT IN AN ORGANIZED HEALTH CARE EDUCATION/TRAINING PROGRAM

## 2023-12-13 PROCEDURE — 99213 PR OFFICE/OUTPT VISIT, EST, LEVL III, 20-29 MIN: ICD-10-PCS | Mod: 25,S$GLB,, | Performed by: STUDENT IN AN ORGANIZED HEALTH CARE EDUCATION/TRAINING PROGRAM

## 2023-12-13 PROCEDURE — 93005 ELECTROCARDIOGRAM TRACING: CPT

## 2023-12-13 RX ORDER — FUROSEMIDE 20 MG/1
20 TABLET ORAL DAILY PRN
Qty: 20 TABLET | Refills: 0 | Status: SHIPPED | OUTPATIENT
Start: 2023-12-13 | End: 2023-12-29 | Stop reason: SDUPTHER

## 2023-12-13 NOTE — TELEPHONE ENCOUNTER
Refill Routing Note   Medication(s) are not appropriate for processing by [facility] for the following reason(s):        ED/Hospital Visit since last OV with provider  Patient not seen by provider within 15 months  Outside of protocol    [facility] action(s):  Defer  Route               Appointments  past 12m or future 3m with PCP    Date Provider   Last Visit   10/17/2019 Shameka Ford MD   Next Visit   4/15/2024 Shameka Ford MD   ED visits in past 90 days: 0        Note composed:5:33 PM 12/13/2023

## 2023-12-13 NOTE — PROGRESS NOTES
"                Section of Cardiology                  Cardiac Clinic Note    Chief Complaint/Reason for consultation: chest pain      HPI:   Lee Ayala is a 43 y.o. female who presents to the Emergency Department for mid-sternal CP which onset at 2200 last night. Pt describes her pain as a pressure and states that her pain does not radiate. Symptoms are constant and moderate in severity. No mitigating or exacerbating factors reported. Associated sxs include chest pressure, bilateral leg swelling, and chest tightness.      Patient left the ER on yesterday AMA  Labs from yesterday showed potassium was 3.0 and an anemia      EKG   Normal sinus rhythm   Nonspecific ST and T wave abnormality      Does drink caffeine and eating a lot of fried food   Denies drinking energy drinks or loaded teas  Negative for SOB or pain with breathing  or acid reflux symptoms      Describes pain as mid sternal pressure     While in the ER was given PO potassium         12/13/23  Seen Dr. Valadez in 2/23 for chest pain  ETT 2/23 w/o ischemia (did not get to [facility]R due to chest pain)- walked 4 min  Echo 2/23 EF 55%  Started having pain since yesterday- intermittent  Described as a "pain" in the middle of her chest  No SOB, palpitations, syncope, nausea, vomiting  Says tylenol does not help  Comes on throughout the day with exertion   Wakes her up out of sleep   Recent iron levels normal- takes iron supplements   Weight stable  Stopped exercising about a few months ago  Currently not working      Family hx: paternal family- fluid build up, chest pain    EKG 12/13/23 NSR, sinus arrhythmia     ECHO  Results for orders placed during the hospital encounter of 02/23/23    Echo    Interpretation Summary  · The left ventricle is normal in size with normal systolic function.  · The estimated ejection fraction is 55%.  · Normal left ventricular diastolic function.  · Normal right ventricular size with normal right ventricular " systolic function.  · Mild tricuspid regurgitation.  · Intermediate central venous pressure (8 mmHg).  · The estimated PA systolic pressure is 33 mmHg.       STRESS TEST Results for orders placed during the hospital encounter of 02/23/23    Exercise Stress - EKG    Interpretation Summary    The ECG portion of the study is negative for ischemia.    The patient reported chest pain during the stress test.    The blood pressure response to stress was normal.    There were no arrhythmias during stress.    The exercise capacity was normal.    The patient exercised for 4 minutes 3 seconds on a Satya protocol, corresponding to a functional capacity of 7 METS, achieving a peak heart rate of 134 bpm, which is 80 % of the age predicted maximum heart rate. The patient experienced non-limiting angina during the test. Their exercise capacity was normal.       [facility] No results found for this or any previous visit.            ROS: All 10 systems reviewed. Please refer to the HPI for pertinent positives. All other systems negative.     Past Medical History  Past Medical History:   Diagnosis Date    Hypertension        Surgical History  History reviewed. No pertinent surgical history.       Allergies:   Review of patient's allergies indicates:  No Known Allergies    Social History:  Social History     Socioeconomic History    Marital status: Single   Tobacco Use    Smoking status: Never     Passive exposure: Never    Smokeless tobacco: Never   Substance and Sexual Activity    Alcohol use: No    Drug use: No    Sexual activity: Never       Family History:  family history is not on file.    Home Medications:  Current Outpatient Medications on File Prior to Visit   Medication Sig Dispense Refill    ferrous sulfate (IRON, FERROUS SULFATE,) 325 mg (65 mg iron) Tab tablet Take 1 tablet (325 mg total) by mouth once daily. 90 tablet 0    potassium chloride SA (K-DUR,KLOR-CON) 20 MEQ tablet Take 1 tablet (20 mEq total) by mouth once daily. 90  "tablet 0    [DISCONTINUED] hydroCHLOROthiazide (HYDRODIURIL) 25 MG tablet Take 1 tablet (25 mg total) by mouth once daily. 90 tablet 1     No current facility-administered medications on file prior to visit.       Physical exam:  /80 (BP Location: Right arm, Patient Position: Sitting, BP Method: Large (Manual))   Pulse 76   Ht 5' 2" (1.575 m)   Wt 76.2 kg (167 lb 15.9 oz)   SpO2 99%   BMI 30.73 kg/m²         General: Pt is a 43 y.o. year old female who is AAOx3, in NAD, is pleasant, well nourished, looks stated age  HEENT: PERRL, EOMI, Oral mucosa pink & moist  CVS  No abnormal cardiac pulsations noted on inspection. JVP not raised. The apical impulse is normal on palpation, and is located in the left 5th intercostal space in the mid - clavicular line. No palpable thrills or abnormal pulsations noted. RR, S1 - S2 heard, no murmurs, rubs or gallops appreciated.   PUL : CTA B/L. No wheezes/crackles heard   ABD : BS +, soft. No tenderness elicited   LE : No C/C/E. Distal Pulses palpable B/L         LABS:    Chemistry:   Lab Results   Component Value Date     10/12/2023    K 3.7 10/12/2023     10/12/2023    CO2 26 10/12/2023    BUN 7 10/12/2023    CREATININE 0.8 10/12/2023    CALCIUM 9.3 10/12/2023     Cardiac Markers:   Lab Results   Component Value Date    TROPONINI 0.012 02/09/2023     Cardiac Markers (Last 3):   Lab Results   Component Value Date    TROPONINI 0.012 02/09/2023     CBC:   Lab Results   Component Value Date    WBC 3.57 (L) 10/12/2023    HGB 11.3 (L) 10/12/2023    HCT 33.6 (L) 10/12/2023     (H) 10/12/2023     10/12/2023     Lipids:   Lab Results   Component Value Date    CHOL 136 10/12/2023    TRIG 101 10/12/2023    HDL 39 (L) 10/12/2023     Coagulation: No results found for: "PT", "INR", "APTT"        Assessment    1. Edema of both legs    2. Class 1 obesity due to excess calories without serious comorbidity with body mass index (BMI) of 30.0 to 30.9 in adult    3. " Obesity (BMI 30-39.9)    4. Atypical chest pain    5. Costochondritis         Plan:      Edema  Low salt diet  Lasix prn    Costochondritis   Tenderness with palpitation  Try prn OTC ibuprofen     Obesity, Body mass index is 30.73 kg/m².   Low salt, low fat diet  Exercise as tolerated, at least 30 min daily     This note was prepared using voice recognition system and is likely to have sound alike errors that may have been overlooked even after proofreading.     I have reviewed all pertinent chart information.  Plans and recommendations have been formulated under my direct supervision. All questions answered and patient voiced understanding.   If symptoms persist go to the ED.    RTC prn        Sabino Pedersen MD  Cardiology

## 2023-12-13 NOTE — TELEPHONE ENCOUNTER
No care due was identified.  Health [facility] Embedded Care Due Messages. Reference number: [id number].   12/13/2023 4:46:18 PM CST

## 2023-12-14 RX ORDER — FERROUS SULFATE TAB 325 MG (65 MG ELEMENTAL FE) 325 (65 FE) MG
TAB ORAL
Qty: 90 TABLET | Refills: 0 | Status: SHIPPED | OUTPATIENT
Start: 2023-12-14

## 2023-12-14 RX ORDER — POTASSIUM CHLORIDE 20 MEQ/1
20 TABLET, EXTENDED RELEASE ORAL
Qty: 90 TABLET | Refills: 0 | Status: SHIPPED | OUTPATIENT
Start: 2023-12-14

## 2023-12-29 RX ORDER — FUROSEMIDE 20 MG/1
20 TABLET ORAL DAILY PRN
Qty: 20 TABLET | Refills: 0 | Status: SHIPPED | OUTPATIENT
Start: 2023-12-29 | End: 2024-01-22 | Stop reason: SDUPTHER

## 2023-12-29 NOTE — TELEPHONE ENCOUNTER
----- Message from Brigette Cunningham sent at 12/29/2023 10:05 AM CST -----  Contact: [phone]    Requesting an RX refill or new RX.-Lee Ayala -    Is this a refill or new RX: - Refill-    RX name and strength (furosemide (LASIX) 20 MG tablet 20 tablet)    Is this a 30 day or 90 day RX: -20-    Pharmacy name and phone # (    [facility] #[id number] - [facility] [place], [place] - [address] [address] & [address] [address]  Phone: [phone] Fax: [phone]    The doctors have asked that we provide their patients with the following 2 reminders -- prescription refills can take up to 72 hours, and a friendly reminder that in the future you can use your [facility] account to request refills:- Call back-    Comments: Please call the patient back to advise.

## 2023-12-29 NOTE — TELEPHONE ENCOUNTER
Care Due:                  Date            Visit Type   Department     Provider  --------------------------------------------------------------------------------    Last Visit: None Found      None         None Found                              EP -                              PRIMARY      [facility] INTERNAL  Next Visit: 04-      CARE ([facility])   MEDICINE       Shameka Ford                                                            Last  Test          Frequency    Reason                     Performed    Due Date  --------------------------------------------------------------------------------    Office Visit  15 months..  potassium................  Not Found    Overdue    [employer] Embedded Care Due Messages. Reference number: [id number].   12/29/2023 11:10:25 AM CST

## 2024-01-02 RX ORDER — FUROSEMIDE 20 MG/1
20 TABLET ORAL DAILY PRN
Qty: 20 TABLET | Refills: 0 | Status: CANCELLED | OUTPATIENT
Start: 2024-01-02 | End: 2025-01-01

## 2024-01-02 NOTE — TELEPHONE ENCOUNTER
No care due was identified.  Health [facility] Embedded Care Due Messages. Reference number: [id number].   1/02/2024 9:36:18 AM CST

## 2024-01-02 NOTE — TELEPHONE ENCOUNTER
----- Message from Shayy Chow sent at 1/2/2024  9:18 AM CST -----  Type:  Needs Medical Advice    Who Called: pt     Would the patient rather a call back or a response via [facility]?  call  Best Call Back Number:  [phone]  Additional Information: pt is requesting to get a return call pt has questions to ask about her fluid pills

## 2024-01-22 NOTE — TELEPHONE ENCOUNTER
----- Message from Vandana Serrano sent at 1/22/2024  4:20 PM CST -----  Pt is requesting refills for her fluid pills.     [facility] #[id number] - [place] [address] [address]  Phone: [phone] Fax: [phone]

## 2024-01-22 NOTE — TELEPHONE ENCOUNTER
No care due was identified.  Health [facility] Embedded Care Due Messages. Reference number: [id number].   1/22/2024 4:39:08 PM CST

## 2024-01-23 RX ORDER — FUROSEMIDE 20 MG/1
20 TABLET ORAL DAILY PRN
Qty: 20 TABLET | Refills: 0 | Status: SHIPPED | OUTPATIENT
Start: 2024-01-23 | End: 2024-02-13 | Stop reason: SDUPTHER

## 2024-01-23 NOTE — TELEPHONE ENCOUNTER
Refill Routing Note   Medication(s) are not appropriate for processing by [facility] for the following reason(s):        ED/Hospital Visit since last OV with provider  Patient not seen by provider within 15 months    [facility] action(s):  Defer               Appointments  past 12m or future 3m with PCP    Date Provider   Last Visit   10/17/2019 Shameka Ford MD   Next Visit   4/15/2024 Shameka Ford MD   ED visits in past 90 days: 0        Note composed:2:53 AM 01/23/2024

## 2024-02-13 NOTE — TELEPHONE ENCOUNTER
----- Message from Ailyn Ford sent at 2/13/2024  8:40 AM CST -----  Contact: JERMAINE PICKENS [[id number]]  ..Type:  Patient Requesting Call    Who Called: JERMAINE PICKENS [[id number]]  Does the patient know what this is regarding?: pt needs refill on fluid pills, unsure of medication   Would the patient rather a call back or a response via [facility]?  call  Best Call Back Number: .[phone] (home)   Additional Information:     .  [facility] #[id number] - [place] [address] [address]  Phone: [phone] Fax: [phone]

## 2024-02-15 RX ORDER — FUROSEMIDE 20 MG/1
20 TABLET ORAL DAILY PRN
Qty: 20 TABLET | Refills: 0 | Status: SHIPPED | OUTPATIENT
Start: 2024-02-15 | End: 2024-03-07 | Stop reason: SDUPTHER

## 2024-02-15 NOTE — TELEPHONE ENCOUNTER
Refill Routing Note   Medication(s) are not appropriate for processing by [facility] for the following reason(s):      Patient not seen by provider within 15 months    [facility] action(s):  Defer Care Due:  None identified            Appointments  past 12m or future 3m with PCP    Date Provider   Last Visit   10/17/2019 Shameka Ford MD   Next Visit   4/15/2024 Shameka Ford MD   ED visits in past 90 days: 0        Note composed:8:47 PM 02/14/2024

## 2024-03-07 RX ORDER — FUROSEMIDE 20 MG/1
20 TABLET ORAL DAILY PRN
Qty: 20 TABLET | Refills: 0 | Status: SHIPPED | OUTPATIENT
Start: 2024-03-07 | End: 2024-04-01

## 2024-03-07 NOTE — TELEPHONE ENCOUNTER
----- Message from Jennifer Lin sent at 3/7/2024  8:16 AM CST -----  Regarding: refill  Type:  RX Refill Request    Who Called: Lee  Refill or New Rx:refill  RX Name and Strength:(LASIX)  How is the patient currently taking it? (ex. 1XDay):  Is this a 30 day or 90 day RX:  Preferred Pharmacy with phone number:[facility] #[id number] [facility] [address] [place] [address]   Phone: [phone]  Fax: [phone]        Local or Mail Order:local  Ordering Provider:  Would the patient rather a call back or a response via [facility]? Call back  Best Call Back Number: [phone]  Additional Information: Pt completely out

## 2024-03-07 NOTE — TELEPHONE ENCOUNTER
No care due was identified.  [facility] Embedded Care Due Messages. Reference number: [id number].   3/07/2024 8:33:00 AM CST

## 2024-03-07 NOTE — TELEPHONE ENCOUNTER
Refill Routing Note   Medication(s) are not appropriate for processing by [facility] for the following reason(s):        Outside of protocol:PRN USE  Other    [facility] action(s):  Route        Medication Therapy Plan: PRN Use for Furosemide is outside of protocol.      Appointments  past 12m or future 3m with PCP    Date Provider   Last Visit   10/17/2019 Shameka Ford MD   Next Visit   4/15/2024 Shameka Ford MD   ED visits in past 90 days: 0        Note composed:8:58 AM 03/07/2024

## 2024-03-07 NOTE — TELEPHONE ENCOUNTER
Patient requesting a refill on furosemide. Says she is completely out of medication. Would like sent to [facility] on [address].     LOV 10/12/23

## 2024-03-28 NOTE — TELEPHONE ENCOUNTER
----- Message from Jessica Lester sent at 3/28/2024  9:39 AM CDT -----  Contact: Lee Yeboah is calling in regards to requesting refills on the medication furosemide (LASIX) 20 MG tablet.  Please call back with status of fill to .[phone].        [facility] #[id number] - [place] - [address] [address] [facility]

## 2024-03-28 NOTE — TELEPHONE ENCOUNTER
No care due was identified.  [facility] Embedded Care Due Messages. Reference number: [id number].   3/28/2024 9:48:20 AM CDT

## 2024-03-29 NOTE — TELEPHONE ENCOUNTER
Refill Routing Note   Medication(s) are not appropriate for processing by [facility] for the following reason(s):        Outside of protocol    [facility] action(s):  Route        Medication Therapy Plan: PRN use is OOP      Appointments  past 12m or future 3m with PCP    Date Provider   Last Visit   10/17/2019 Shameka Ford MD   Next Visit   4/15/2024 Shameka Ford MD   ED visits in past 90 days: 0        Note composed:12:00 AM 03/29/2024

## 2024-04-01 ENCOUNTER — TELEPHONE (OUTPATIENT)
Dept: INTERNAL MEDICINE | Facility: CLINIC | Age: 44
End: 2024-04-01
Payer: MEDICAID

## 2024-04-01 RX ORDER — FUROSEMIDE 20 MG/1
20 TABLET ORAL DAILY PRN
Qty: 20 TABLET | Refills: 2 | Status: SHIPPED | OUTPATIENT
Start: 2024-04-01 | End: 2024-04-24 | Stop reason: DRUGHIGH

## 2024-04-24 ENCOUNTER — TELEPHONE (OUTPATIENT)
Dept: INTERNAL MEDICINE | Facility: CLINIC | Age: 44
End: 2024-04-24
Payer: MEDICAID

## 2024-04-24 DIAGNOSIS — R60.0 EDEMA OF BOTH LEGS: Primary | ICD-10-CM

## 2024-04-24 RX ORDER — FUROSEMIDE 40 MG/1
40 TABLET ORAL DAILY
Qty: 90 TABLET | Refills: 0 | Status: SHIPPED | OUTPATIENT
Start: 2024-04-24 | End: 2025-04-24

## 2024-04-24 NOTE — TELEPHONE ENCOUNTER
Called and spoke with patient. Patient states that she has to pay $28 for her lasix now because she is running out quickly and her insurance will no longer pay them because of the quantity. Patient is only receiving 20 tablets at a time. Is requesting a full prescription and an increase in dosage. Says the 20mg do not seem to be working anymore. Says she was unable to move around much yesterday because her feet and legs were so swollen and she is out of lasix at this time.    Please advise.    Would like new rx sent to [facility] on [address]

## 2024-04-24 NOTE — TELEPHONE ENCOUNTER
----- Message from Anna Marie Preston sent at 4/24/2024  8:33 AM CDT -----  Contact: self  [phone]  Pt requesting a call back to discuss furosemide (LASIX) 20 MG tablet because of the cost . Please call back       [facility] #[id number] - [place] - [address] [facility] [address] [address]  Phone: [phone] Fax: [phone]

## 2024-04-24 NOTE — TELEPHONE ENCOUNTER
Called and spoke with patient.  Patient says she may be moving out of town next month but will schedule an appointment before she does.

## 2024-07-20 DIAGNOSIS — R60.0 EDEMA OF BOTH LEGS: ICD-10-CM

## 2024-07-22 RX ORDER — FUROSEMIDE 40 MG/1
40 TABLET ORAL
Qty: 90 TABLET | Refills: 0 | Status: SHIPPED | OUTPATIENT
Start: 2024-07-22

## 2024-07-22 NOTE — TELEPHONE ENCOUNTER
Refill Routing Note   Medication(s) are not appropriate for processing by [facility] for the following reason(s):             [facility] action(s):                        Appointments  past 12m or future 3m with PCP    Date Provider   Last Visit   10/12/2023 Nazanin Malik NP   Next Visit   Visit date not found Nazanin Malik NP   ED visits in past 90 days: 0        Note composed:10:03 AM 07/22/2024

## 2024-07-22 NOTE — TELEPHONE ENCOUNTER
Care Due:                  Date            Visit Type   Department     Provider  --------------------------------------------------------------------------------    Last Visit: None Found      None         None Found  Next Visit: None Scheduled  None         None Found                                                            Last  Test          Frequency    Reason                     Performed    Due Date  --------------------------------------------------------------------------------    Office Visit  15 months..  potassium................  Not Found    Overdue    CMP.........  12 months..  potassium................  10-   10-    Health [facility] Embedded Care Due Messages. Reference number: [id number].   7/22/2024 10:05:37 AM CDT

## 2024-10-19 DIAGNOSIS — R60.0 EDEMA OF BOTH LEGS: ICD-10-CM

## 2024-10-21 NOTE — TELEPHONE ENCOUNTER
Refill Routing Note   Medication(s) are not appropriate for processing by [facility] for the following reason(s):        Patient not seen by provider within 15 months  ED/Hospital Visit since last OV with provider  Required labs outdated  No active prescription written by provider    [facility] action(s):  Defer   Requires appointment : Yes   Requires labs : Yes             Appointments  past 12m or future 3m with PCP    Date Provider   Last Visit   10/17/2019 Shameka Ford MD   Next Visit   Visit date not found Shameka Ford MD   ED visits in past 90 days: 0        Note composed:10:07 AM 10/21/2024

## 2024-10-21 NOTE — TELEPHONE ENCOUNTER
Care Due:                  Date            Visit Type   Department     Provider  --------------------------------------------------------------------------------    Last Visit: None Found      None         None Found  Next Visit: None Scheduled  None         None Found                                                            Last  Test          Frequency    Reason                     Performed    Due Date  --------------------------------------------------------------------------------    Office Visit  15 months..  potassium................  Not Found    Overdue    CMP.........  12 months..  potassium................  10-   10-    Health [facility] Embedded Care Due Messages. Reference number: [id number].   10/21/2024 9:52:13 AM CDT

## 2024-10-22 DIAGNOSIS — R60.0 EDEMA OF BOTH LEGS: ICD-10-CM

## 2024-10-22 RX ORDER — FUROSEMIDE 40 MG/1
40 TABLET ORAL
Qty: 90 TABLET | Refills: 0 | OUTPATIENT
Start: 2024-10-22

## 2024-10-22 NOTE — TELEPHONE ENCOUNTER
----- Message from Elizabeth sent at 10/22/2024 10:04 AM CDT -----  Can the clinic reply in [facility]:JERMAINE PICKENS [[id number]]        Please refill the medication(s) listed below. Please call the patient when the prescription(s) is ready for  at this phone number   Telephone Information:  Mobile          [phone]    Pt saying the pharmacy didn't approve of this mediation please advise thank you        Medication #1furosemide (LASIX) 40 MG tablet             Preferred Pharmacy:   [facility] #[id number] - [place] [address] [address]  Phone: [phone] Fax: [phone]

## 2024-10-22 NOTE — TELEPHONE ENCOUNTER
Returned patient call, advised patient she will need an appointment first wit Dr. Ford for med refill. Patient stated she don't have transportation and its been hard trying to schedule her appointments. Patient stated she will call back to schedule appointment at later date.

## 2024-10-23 NOTE — TELEPHONE ENCOUNTER
Provider Staff:  Please note Refusal of medication.     Action required for this patient.      Requested Prescriptions     Refused Prescriptions Disp Refills    furosemide (LASIX) 40 MG tablet [Pharmacy Med Name: FUROSEMIDE 40MG TABLETS] 90 tablet 0     Sig: TAKE 1 TABLET(40 MG) BY MOUTH DAILY     Refused By: RADHA DANIEL     Reason for Refusal: Patient needs an appointment      Thanks!  [facility]   Note composed: 10/23/2024 9:31 AM

## 2024-11-01 ENCOUNTER — TELEPHONE (OUTPATIENT)
Dept: INTERNAL MEDICINE | Facility: CLINIC | Age: 44
End: 2024-11-01
Payer: MEDICARE

## 2025-01-02 ENCOUNTER — TELEPHONE (OUTPATIENT)
Dept: INTERNAL MEDICINE | Facility: CLINIC | Age: 45
End: 2025-01-02
Payer: MEDICARE

## 2025-01-02 NOTE — TELEPHONE ENCOUNTER
----- Message from Sasha sent at 1/2/2025 11:14 AM CST -----  Contact: Lee  Type:  Sooner Apoointment Request    Caller is requesting a sooner appointment. Caller will not accept being placed on the waitlist and is requesting a message be sent to doctor.  Name of Caller:Lee  When is the first available appointment?unknown  Symptoms:Fluid build-up/Medication refill  Would the patient rather a call back or a response via [facility]? call  Best Call Back Number:[phone]  Additional Information: Patient reports experiencing fluid build-up without medication and request to schedule for an urgent appointment. Please give patient an immediate call back to assist.   Thank you,  [facility]

## 2025-01-07 ENCOUNTER — OFFICE VISIT (OUTPATIENT)
Dept: INTERNAL MEDICINE | Facility: CLINIC | Age: 45
End: 2025-01-07
Payer: MEDICARE

## 2025-01-07 VITALS
OXYGEN SATURATION: 98 % | WEIGHT: 180.75 LBS | SYSTOLIC BLOOD PRESSURE: 124 MMHG | DIASTOLIC BLOOD PRESSURE: 70 MMHG | TEMPERATURE: 97 F | RESPIRATION RATE: 20 BRPM | BODY MASS INDEX: 33.26 KG/M2 | HEART RATE: 86 BPM | HEIGHT: 62 IN

## 2025-01-07 DIAGNOSIS — L84 CALLUS BETWEEN TOES: ICD-10-CM

## 2025-01-07 DIAGNOSIS — E66.09 CLASS 1 OBESITY DUE TO EXCESS CALORIES WITHOUT SERIOUS COMORBIDITY WITH BODY MASS INDEX (BMI) OF 30.0 TO 30.9 IN ADULT: ICD-10-CM

## 2025-01-07 DIAGNOSIS — Z12.31 ENCOUNTER FOR SCREENING MAMMOGRAM FOR MALIGNANT NEOPLASM OF BREAST: ICD-10-CM

## 2025-01-07 DIAGNOSIS — R60.0 EDEMA OF BOTH LEGS: Primary | ICD-10-CM

## 2025-01-07 DIAGNOSIS — E66.811 CLASS 1 OBESITY DUE TO EXCESS CALORIES WITHOUT SERIOUS COMORBIDITY WITH BODY MASS INDEX (BMI) OF 30.0 TO 30.9 IN ADULT: ICD-10-CM

## 2025-01-07 DIAGNOSIS — D53.9 MACROCYTIC ANEMIA: ICD-10-CM

## 2025-01-07 PROCEDURE — 1159F MED LIST DOCD IN RCRD: CPT | Mod: CPTII,S$GLB,, | Performed by: PHYSICIAN ASSISTANT

## 2025-01-07 PROCEDURE — 99214 OFFICE O/P EST MOD 30 MIN: CPT | Mod: S$GLB,,, | Performed by: PHYSICIAN ASSISTANT

## 2025-01-07 PROCEDURE — 3008F BODY MASS INDEX DOCD: CPT | Mod: CPTII,S$GLB,, | Performed by: PHYSICIAN ASSISTANT

## 2025-01-07 PROCEDURE — 3074F SYST BP LT 130 MM HG: CPT | Mod: CPTII,S$GLB,, | Performed by: PHYSICIAN ASSISTANT

## 2025-01-07 PROCEDURE — 3078F DIAST BP <80 MM HG: CPT | Mod: CPTII,S$GLB,, | Performed by: PHYSICIAN ASSISTANT

## 2025-01-07 PROCEDURE — 99999 PR PBB SHADOW E&M-EST. PATIENT-LVL IV: CPT | Mod: PBBFAC,,, | Performed by: PHYSICIAN ASSISTANT

## 2025-01-07 PROCEDURE — 1160F RVW MEDS BY RX/DR IN RCRD: CPT | Mod: CPTII,S$GLB,, | Performed by: PHYSICIAN ASSISTANT

## 2025-01-07 RX ORDER — FUROSEMIDE 40 MG/1
40 TABLET ORAL DAILY PRN
Qty: 30 TABLET | Refills: 0 | Status: SHIPPED | OUTPATIENT
Start: 2025-01-07 | End: 2025-01-09 | Stop reason: SDUPTHER

## 2025-01-07 NOTE — PROGRESS NOTES
"Subjective:      Patient ID: Lee Ayala is a 44 y.o. female.    Chief Complaint: Foot Swelling (She is here due to swelling in her feet, ankles and legs. Needs to discuss refills on medications. )    Patient is new to me, being seen today for swelling to feet/ankles and legs.   This is chronic, w/u previously negative, she previously was taking lasix 40mg daily which helped but has been out x3mths (states 20mg did not help previously)    Due for labs/annual     Pain between 4th and 5th R toe  Denies trauma/injury   No treatment thus far     Last visit Oct 2023 w Nazanin Malik NP.       Review of Systems   Constitutional:  Negative for chills, diaphoresis and fever.   HENT:  Negative for congestion, rhinorrhea and sore throat.    Respiratory:  Negative for cough, shortness of breath and wheezing.    Cardiovascular:  Positive for chest pain (ongoing, has seen Card) and leg swelling.   Gastrointestinal:  Negative for abdominal pain, constipation, diarrhea, nausea and vomiting.   Skin:  Negative for rash.   Neurological:  Negative for dizziness, light-headedness and headaches.       Objective:   /70 (BP Location: Right arm, Patient Position: Sitting)   Pulse 86   Temp 97 °F (36.1 °C) (Tympanic)   Resp 20   Ht 5' 2" (1.575 m)   Wt 82 kg (180 lb 12.4 oz)   SpO2 98%   BMI 33.06 kg/m²   Physical Exam  Constitutional:       General: She is not in acute distress.     Appearance: She is well-developed. She is not ill-appearing or diaphoretic.   HENT:      Head: Normocephalic and atraumatic.      Right Ear: External ear normal.      Left Ear: External ear normal.   Eyes:      General: Lids are normal.         Right eye: No discharge.         Left eye: No discharge.      Conjunctiva/sclera: Conjunctivae normal.      Right eye: Right conjunctiva is not injected.      Left eye: Left conjunctiva is not injected.   Pulmonary:      Effort: Pulmonary effort is normal. No respiratory distress. "   Musculoskeletal:      Right lower leg: Swelling present. No edema.      Left lower leg: Swelling present. No edema.      Right ankle: Swelling present.      Left ankle: Swelling present.      Right foot: No swelling.      Left foot: No swelling.        Feet:    Skin:     General: Skin is warm and dry.      Findings: No rash.   Neurological:      Mental Status: She is alert and oriented to person, place, and time.   Psychiatric:         Speech: Speech normal.         Behavior: Behavior normal.         Thought Content: Thought content normal.         Judgment: Judgment normal.       Assessment:      1. Edema of both legs    2. Encounter for screening mammogram for malignant neoplasm of breast    3. Macrocytic anemia    4. Class 1 obesity due to excess calories without serious comorbidity with body mass index (BMI) of 30.0 to 30.9 in adult    5. Callus between toes       Plan:   Edema of both legs  -     Comprehensive Metabolic Panel; Future; Expected date: 01/07/2025  -     furosemide (LASIX) 40 MG tablet; Take 1 tablet (40 mg total) by mouth daily as needed (Leg swelling).  Dispense: 30 tablet; Refill: 0    Encounter for screening mammogram for malignant neoplasm of breast  -     Mammo Digital Screening Bilat w/ Jaciel; Future; Expected date: 01/07/2025    Macrocytic anemia  -     CBC Auto Differential; Future; Expected date: 01/07/2025  -     FOLATE; Future; Expected date: 01/07/2025  -     Vitamin B12; Future; Expected date: 01/07/2025    Class 1 obesity due to excess calories without serious comorbidity with body mass index (BMI) of 30.0 to 30.9 in adult  -     Lipid Panel; Future; Expected date: 01/07/2025    Callus between toes  -     Ambulatory referral/consult to Podiatry; Future; Expected date: 01/14/2025      Fasting labs and annual to be scheduled     H/o hypoK w diuretic so may need to be restarted but will await labs   Recommend taking more on prn basis is possible     GLADIS for PAP (states was done in the  past year)

## 2025-01-09 ENCOUNTER — HOSPITAL ENCOUNTER (OUTPATIENT)
Dept: RADIOLOGY | Facility: HOSPITAL | Age: 45
Discharge: HOME | End: 2025-01-09
Attending: PHYSICIAN ASSISTANT
Payer: MEDICARE

## 2025-01-09 ENCOUNTER — OFFICE VISIT (OUTPATIENT)
Dept: INTERNAL MEDICINE | Facility: CLINIC | Age: 45
End: 2025-01-09
Payer: MEDICARE

## 2025-01-09 ENCOUNTER — OFFICE VISIT (OUTPATIENT)
Dept: PODIATRY | Facility: CLINIC | Age: 45
End: 2025-01-09
Payer: MEDICARE

## 2025-01-09 VITALS — BODY MASS INDEX: 33.02 KG/M2 | HEIGHT: 62 IN | WEIGHT: 179.44 LBS

## 2025-01-09 VITALS
HEART RATE: 66 BPM | OXYGEN SATURATION: 98 % | SYSTOLIC BLOOD PRESSURE: 128 MMHG | BODY MASS INDEX: 33.02 KG/M2 | HEIGHT: 62 IN | WEIGHT: 179.44 LBS | DIASTOLIC BLOOD PRESSURE: 82 MMHG | TEMPERATURE: 98 F

## 2025-01-09 DIAGNOSIS — R60.0 EDEMA OF BOTH LEGS: ICD-10-CM

## 2025-01-09 DIAGNOSIS — B35.3 TINEA PEDIS OF RIGHT FOOT: Primary | ICD-10-CM

## 2025-01-09 DIAGNOSIS — Z00.00 ANNUAL PHYSICAL EXAM: Primary | ICD-10-CM

## 2025-01-09 DIAGNOSIS — E87.6 HYPOKALEMIA: ICD-10-CM

## 2025-01-09 DIAGNOSIS — Z12.31 ENCOUNTER FOR SCREENING MAMMOGRAM FOR MALIGNANT NEOPLASM OF BREAST: ICD-10-CM

## 2025-01-09 DIAGNOSIS — Z12.11 SCREEN FOR COLON CANCER: ICD-10-CM

## 2025-01-09 PROCEDURE — 77067 SCR MAMMO BI INCL CAD: CPT | Mod: 26,,, | Performed by: STUDENT IN AN ORGANIZED HEALTH CARE EDUCATION/TRAINING PROGRAM

## 2025-01-09 PROCEDURE — 99999 PR PBB SHADOW E&M-EST. PATIENT-LVL III: CPT | Mod: PBBFAC,,, | Performed by: FAMILY MEDICINE

## 2025-01-09 PROCEDURE — 3008F BODY MASS INDEX DOCD: CPT | Mod: CPTII,S$GLB,, | Performed by: PODIATRIST

## 2025-01-09 PROCEDURE — 99204 OFFICE O/P NEW MOD 45 MIN: CPT | Mod: S$GLB,,, | Performed by: PODIATRIST

## 2025-01-09 PROCEDURE — 77063 BREAST TOMOSYNTHESIS BI: CPT | Mod: TC

## 2025-01-09 PROCEDURE — 99999 PR PBB SHADOW E&M-EST. PATIENT-LVL III: CPT | Mod: PBBFAC,,, | Performed by: PODIATRIST

## 2025-01-09 PROCEDURE — 1160F RVW MEDS BY RX/DR IN RCRD: CPT | Mod: CPTII,S$GLB,, | Performed by: PODIATRIST

## 2025-01-09 PROCEDURE — 77063 BREAST TOMOSYNTHESIS BI: CPT | Mod: 26,,, | Performed by: STUDENT IN AN ORGANIZED HEALTH CARE EDUCATION/TRAINING PROGRAM

## 2025-01-09 PROCEDURE — 1159F MED LIST DOCD IN RCRD: CPT | Mod: CPTII,S$GLB,, | Performed by: PODIATRIST

## 2025-01-09 RX ORDER — CICLOPIROX 7.7 MG/G
GEL TOPICAL 2 TIMES DAILY
Qty: 45 G | Refills: 2 | Status: SHIPPED | OUTPATIENT
Start: 2025-01-09

## 2025-01-09 RX ORDER — TERBINAFINE HYDROCHLORIDE 250 MG/1
250 TABLET ORAL DAILY
Qty: 21 TABLET | Refills: 0 | Status: SHIPPED | OUTPATIENT
Start: 2025-01-09 | End: 2025-01-30

## 2025-01-09 RX ORDER — FUROSEMIDE 40 MG/1
40 TABLET ORAL DAILY PRN
Qty: 30 TABLET | Refills: 5 | OUTPATIENT
Start: 2025-01-09

## 2025-01-09 RX ORDER — POTASSIUM CHLORIDE 20 MEQ/1
20 TABLET, EXTENDED RELEASE ORAL DAILY
Qty: 90 TABLET | Refills: 0 | Status: SHIPPED | OUTPATIENT
Start: 2025-01-09

## 2025-01-09 RX ORDER — FUROSEMIDE 40 MG/1
40 TABLET ORAL DAILY PRN
Qty: 30 TABLET | Refills: 5 | Status: SHIPPED | OUTPATIENT
Start: 2025-01-09 | End: 2025-01-10 | Stop reason: SDUPTHER

## 2025-01-09 NOTE — TELEPHONE ENCOUNTER
Care Due:                  Date            Visit Type   Department     Provider  --------------------------------------------------------------------------------                                EP -                              PRIMARY      [facility]  Last Visit: 01-      [facility]   DAKOTA Ford                              EP -                              PRIMARY      [facility]  Next Visit: 01-      [facility]   [facility]       Shameka Ford                                                            Last  Test          Frequency    Reason                     Performed    Due Date  --------------------------------------------------------------------------------    CMP.........  12 months..  furosemide, potassium....  10-   10-    [facility] Embedded Care Due Messages. Reference number: [id number].   1/09/2025 2:44:00 PM CST

## 2025-01-09 NOTE — PROGRESS NOTES
Lee Ayala  01/09/2025  [id number]    Shameka Ford MD  Patient Care Team:  Shameka Ford MD as PCP - General (Family Medicine)          Visit Type:a scheduled routine follow-up visit    Chief Complaint:  Chief Complaint   Patient presents with    Annual Exam    Foot Pain     Right foot knot between toes       History of Present Illness:    History of Present Illness    CHIEF COMPLAINT:  Ms. Ayala presents today with swollen ankles and burning pain in the toe.    PERIPHERAL EDEMA:  She has not taken Lasix for 3-4 months but reports it was effective for ankle swelling when taken with potassium. She currently manages swelling with daily compression stockings.    FOOT PAIN:  She experiences burning pain between the fourth and fifth toe with an associated growth in the area. The pain disrupts sleep and makes ambulation difficult. Been there about 4 weeks.    PREVENTIVE CARE:  She had a Pap smear last year at [facility] GLADIS signed, and completed a mammogram today as well as labs At age 44, she is due for colon cancer screening and prefers home testing over colonoscopy. She plans to get flu vaccination at [facility].            The following were reviewed at this visit: active problem list, medication list, allergies, family history, social history, and health maintenance.  History:  Past Medical History:   Diagnosis Date    Hypertension      No past surgical history on file.  Patient Active Problem List   Diagnosis    Atypical chest pain    Edema of both legs    Class 1 obesity due to excess calories without serious comorbidity with body mass index (BMI) of 30.0 to 30.9 in adult    Pain in both lower extremities       Medications:  Current Outpatient Medications on File Prior to Visit   Medication Sig Dispense Refill    [DISCONTINUED] furosemide (LASIX) 40 MG tablet Take 1 tablet (40 mg total) by mouth daily as needed (Leg swelling). 30 tablet 0    FEROSUL 325 mg (65 mg iron) Tab tablet TAKE  1 TABLET BY MOUTH DAILY (Patient not taking: Reported on 1/9/2025) 90 tablet 0    [DISCONTINUED] potassium chloride SA (K-DUR,KLOR-CON) 20 MEQ tablet TAKE 1 TABLET BY MOUTH DAILY (Patient not taking: Reported on 1/9/2025) 90 tablet 0     No current facility-administered medications on file prior to visit.       Medications have been reviewed and reconciled with patient at this visit.    Exam:  Wt Readings from Last 3 Encounters:   01/09/25 81.4 kg (179 lb 7.3 oz)   01/07/25 82 kg (180 lb 12.4 oz)   12/13/23 76.2 kg (167 lb 15.9 oz)     Temp Readings from Last 3 Encounters:   01/09/25 97.9 °F (36.6 °C) (Tympanic)   01/07/25 97 °F (36.1 °C) (Tympanic)   10/12/23 98.6 °F (37 °C) (Tympanic)     BP Readings from Last 3 Encounters:   01/09/25 128/82   01/07/25 124/70   12/13/23 118/80     Pulse Readings from Last 3 Encounters:   01/09/25 66   01/07/25 86   12/13/23 76     Body mass index is 32.82 kg/m².      Review of Systems   Constitutional: Negative.  Negative for chills and fever.   HENT: Negative.  Negative for congestion, sinus pain and sore throat.    Eyes:  Negative for blurred vision and double vision.   Respiratory:  Negative for cough, sputum production, shortness of breath and wheezing.    Cardiovascular:  Positive for leg swelling. Negative for chest pain and palpitations.   Gastrointestinal:  Negative for abdominal pain, constipation, diarrhea, heartburn, nausea and vomiting.   Genitourinary: Negative.    Musculoskeletal:  Positive for joint pain.   Skin: Negative.  Negative for rash.   Neurological: Negative.    Endo/Heme/Allergies: Negative.  Negative for polydipsia. Does not bruise/bleed easily.   Psychiatric/Behavioral:  Negative for depression and substance abuse.      Physical Exam  Nursing note reviewed.   Cardiovascular:      Rate and Rhythm: Normal rate and regular rhythm.   Pulmonary:      Effort: Pulmonary effort is normal. No respiratory distress.   Musculoskeletal:      Right lower leg: Edema  present.      Left lower leg: Edema present.   Skin:     Comments: Cyst between toe- 4/5 digit See picture   Neurological:      Mental Status: She is alert and oriented to person, place, and time.   Psychiatric:         Mood and Affect: Mood normal.         Behavior: Behavior normal.         Thought Content: Thought content normal.         Judgment: Judgment normal.       Physical Exam    Skin: Growth underneath the last toe.        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  Z12.31 Encounter for screening mammogram for malignant neoplasm of breast  Z12.11 Encounter for screening for malignant neoplasm of colon  L98.8 Other specified disorders of the skin and subcutaneous tissue  M79.675 Pain in left toe(s)  Z12.4 Encounter for screening for malignant neoplasm of cervix  Reviewed swollen ankles and medication delay  Evaluated labs results including complete blood count, electrolytes, cholesterol, folate, and B12  Assessed colon cancer screening options given patient's age of 44  Examined growth between 4th and 5th toe, likely 2 small cysts/bumps  Considered need for potassium supplementation with daily Lasix use  Noted recent flu A infection    EDEMA:  - Refilled Lasix (furosemide) for daily use to manage edema.  - Prescribed potassium supplement to be taken with daily Lasix.  - Ms. Ayala reports ongoing issues with swollen ankles and fluid retention in legs and feet.  - Advised the  patient to wear compression stockings to manage edema.    MEDICATIONS/SUPPLEMENTS:  - Instructed the patient to check with pharmacy about delayed refill.  - Ms. Ayala to check with [facility] pharmacy regarding delayed medication.    LABS:  - Ordered labs including complete electrolytes and cholesterol.    MAMMOGRAM:  - Noted that the patient had a mammogram performed.    COLON CANCER SCREENING:  - Explained colonoscopy procedure and its advantages in polyp removal.  - Described at-home stool collection process for colon cancer screening.  - Ordered FIT (Fecal Immunochemical Test) kit for at-home colon cancer screening.  - Discussed colon cancer screening options with the patient, including colonoscopy and home stool test.  - Instructed the patient on the importance of dating the sample for the home stool test.    FOOT GROWTH:  - Referred the patient to podiatry for evaluation and treatment of growth between 4th and 5th toe.  - Advised the patient to contact the office if podiatry appointment becomes available earlier than scheduled date of the 14th.  - Ms. Ayala reports a painful growth between the fourth and fifth toe that has been present for about a month but has recently become more irritating.  - Examined the growth and identified two small cysts between the toes.    LEFT PINKY TOE PAIN:  - Ms. Ayala reports severe burning pain in the left pinky toe, affecting walking and sleep.    PAP SMEAR:  - Noted that the patient reports having a Pap smear last year at [facility].  - Confirmed that a release form was signed to obtain Pap smear results.    FLU VACCINATION:  - Discussed importance of flu vaccination, including protection against multiple strains.  - Follow up in 1-2 weeks for flu vaccine.       Lee was seen today for annual exam and foot pain.    Diagnoses and all orders for this visit:    Annual physical exam  -     Cologuard Screening (Multitarget Stool DNA); Future  -     Cologuard Screening  (Multitarget Stool DNA)    Screen for colon cancer    Edema of both legs  -     furosemide (LASIX) 40 MG tablet; Take 1 tablet (40 mg total) by mouth daily as needed (Leg swelling).    Hypokalemia  -     potassium chloride SA (K-DUR,KLOR-CON) 20 MEQ tablet; Take 1 tablet (20 mEq total) by mouth once daily.          Signed a release for pap smear        Care Plan/Goals: Reviewed     Follow up: Follow up in about 1 year (around 1/9/2026).    After visit summary was printed and given to patient upon discharge today.  Patient goals and care plan are included in After Visit Summary.    This note was generated with the assistance of ambient listening technology. Verbal consent was obtained by the patient and accompanying visitor(s) for the recording of patient appointment to facilitate this note. I attest to having reviewed and edited the generated note for accuracy, though some syntax or spelling errors may persist. Please contact the author of this note for any clarification.

## 2025-01-09 NOTE — PROGRESS NOTES
"Subjective:       Patient ID: Lee Ayala is a 44 y.o. female.    Chief Complaint: Callouses (Callus between toes, rate pain 10/10, nondiabetic, pt is wearing tennis )    HPI: Lee Ayala presents to the clinic today with the chief complaint of persistent pruritus and burning and pains to the right foot at the 4th webspace. Patient states these symptoms have been on going for several weeks to a month or so. Patient has tried OTC topical medications. States prior evaluation by MD//DPHEIDI.     Review of patient's allergies indicates:  No Known Allergies    Past Medical History:   Diagnosis Date    Hypertension        No family history on file.    Social History     Socioeconomic History    Marital status: Single   Tobacco Use    Smoking status: Never     Passive exposure: Never    Smokeless tobacco: Never   Substance and Sexual Activity    Alcohol use: No    Drug use: No    Sexual activity: Never       No past surgical history on file.    Review of Systems   Constitutional:  Negative for chills, fatigue and fever.   HENT:  Negative for hearing loss.    Eyes:  Negative for photophobia and visual disturbance.   Respiratory:  Negative for cough, chest tightness, shortness of breath and wheezing.    Cardiovascular:  Negative for chest pain and palpitations.   Gastrointestinal:  Negative for constipation, diarrhea, nausea and vomiting.   Endocrine: Negative for cold intolerance and heat intolerance.   Genitourinary:  Negative for flank pain.   Musculoskeletal:  Negative for neck pain and neck stiffness.   Skin:  Positive for rash.   Neurological:  Negative for light-headedness and headaches.   Psychiatric/Behavioral:  Negative for sleep disturbance.           Objective:   Ht 5' 2" (1.575 m)   Wt 81.4 kg (179 lb 7.3 oz)   BMI 32.82 kg/m²       Physical Exam    LOWER EXTREMITY PHYSICAL EXAMINATION  DERMATOLOGY:  Webspace maceration, RLE, 4th interspace. Tinea pedis is noted.    "   Assessment:     1. Tinea pedis of right foot        Plan:     Tinea pedis of right foot  -     Ambulatory referral/consult to Podiatry  -     ciclopirox 0.77 % Gel; Apply topically 2 (two) times daily.  Dispense: 45 g; Refill: 2  -     terbinafine HCL (LAMISIL) 250 mg tablet; Take 1 tablet (250 mg total) by mouth once daily. for 21 days  Dispense: 21 tablet; Refill: 0            Future Appointments   Date Time Provider Department Center   1/12/2026 10:20 AM [facility]

## 2025-01-10 DIAGNOSIS — R60.0 EDEMA OF BOTH LEGS: ICD-10-CM

## 2025-01-10 PROBLEM — D51.3 OTHER DIETARY VITAMIN B12 DEFICIENCY ANEMIA: Status: ACTIVE | Noted: 2025-01-10

## 2025-01-10 RX ORDER — FUROSEMIDE 40 MG/1
40 TABLET ORAL DAILY PRN
Qty: 30 TABLET | Refills: 5 | Status: SHIPPED | OUTPATIENT
Start: 2025-01-10

## 2025-01-10 NOTE — TELEPHONE ENCOUNTER
Provider Staff:  Action required for this patient    Requires labs      Please see care gap opportunities below in Care Due Message.    Thanks!  [facility]     Appointments      Date Provider   Last Visit   1/9/2025 Shameka Ford MD   Next Visit   Visit date not found Shameka Ford MD     Refill Decision Note   Lee Ayaal  is requesting a refill authorization.  Brief Assessment and Rationale for Refill:  Quick Discontinue     Medication Therapy Plan:         Comments:     Note composed:6:53 PM 01/09/2025

## 2025-02-17 ENCOUNTER — PATIENT OUTREACH (OUTPATIENT)
Dept: ADMINISTRATIVE | Facility: HOSPITAL | Age: 45
End: 2025-02-17
Payer: MEDICARE

## 2025-07-13 DIAGNOSIS — E87.6 HYPOKALEMIA: ICD-10-CM

## 2025-07-13 NOTE — TELEPHONE ENCOUNTER
No care due was identified.  [facility] Embedded Care Due Messages. Reference number: [id number].   7/13/2025 3:45:59 AM CDT

## 2025-07-14 RX ORDER — POTASSIUM CHLORIDE 20 MEQ/1
20 TABLET, EXTENDED RELEASE ORAL DAILY
Qty: 90 TABLET | Refills: 1 | Status: SHIPPED | OUTPATIENT
Start: 2025-07-14

## 2025-07-14 NOTE — TELEPHONE ENCOUNTER
Lee Ayala  is requesting a refill authorization.  Brief Assessment and Rationale for Refill:  Approve     Medication Therapy Plan:         Comments:     Note composed:12:12 PM 07/14/2025